# Patient Record
Sex: FEMALE | Race: WHITE | NOT HISPANIC OR LATINO | Employment: PART TIME | URBAN - METROPOLITAN AREA
[De-identification: names, ages, dates, MRNs, and addresses within clinical notes are randomized per-mention and may not be internally consistent; named-entity substitution may affect disease eponyms.]

---

## 2017-08-02 ENCOUNTER — OFFICE VISIT (OUTPATIENT)
Dept: URGENT CARE | Age: 67
End: 2017-08-02
Payer: COMMERCIAL

## 2017-08-02 PROCEDURE — 99213 OFFICE O/P EST LOW 20 MIN: CPT

## 2020-08-16 ENCOUNTER — APPOINTMENT (INPATIENT)
Dept: ULTRASOUND IMAGING | Facility: HOSPITAL | Age: 70
DRG: 305 | End: 2020-08-16
Payer: MEDICARE

## 2020-08-16 ENCOUNTER — APPOINTMENT (EMERGENCY)
Dept: CT IMAGING | Facility: HOSPITAL | Age: 70
DRG: 305 | End: 2020-08-16
Payer: MEDICARE

## 2020-08-16 ENCOUNTER — HOSPITAL ENCOUNTER (INPATIENT)
Facility: HOSPITAL | Age: 70
LOS: 1 days | Discharge: HOME | DRG: 305 | End: 2020-08-17
Attending: EMERGENCY MEDICINE | Admitting: HOSPITALIST
Payer: MEDICARE

## 2020-08-16 ENCOUNTER — APPOINTMENT (EMERGENCY)
Dept: RADIOLOGY | Facility: HOSPITAL | Age: 70
DRG: 305 | End: 2020-08-16
Payer: MEDICARE

## 2020-08-16 DIAGNOSIS — R07.9 CHEST PAIN: Primary | ICD-10-CM

## 2020-08-16 DIAGNOSIS — K80.20 CHOLELITHIASIS: ICD-10-CM

## 2020-08-16 DIAGNOSIS — I16.0 HYPERTENSIVE URGENCY: ICD-10-CM

## 2020-08-16 PROBLEM — E66.01 MORBID OBESITY WITH BMI OF 45.0-49.9, ADULT (HCC): Status: ACTIVE | Noted: 2020-08-16

## 2020-08-16 PROBLEM — E03.9 HYPOTHYROIDISM: Status: ACTIVE | Noted: 2020-08-16

## 2020-08-16 LAB
ALBUMIN SERPL BCP-MCNC: 3.6 G/DL (ref 3.5–5)
ALP SERPL-CCNC: 92 U/L (ref 46–116)
ALT SERPL W P-5'-P-CCNC: 46 U/L (ref 12–78)
ANION GAP SERPL CALCULATED.3IONS-SCNC: 8 MMOL/L (ref 4–13)
APTT PPP: 29 SECONDS (ref 23–37)
AST SERPL W P-5'-P-CCNC: 38 U/L (ref 5–45)
BASOPHILS # BLD AUTO: 0.07 THOUSANDS/ΜL (ref 0–0.1)
BASOPHILS NFR BLD AUTO: 1 % (ref 0–1)
BILIRUB SERPL-MCNC: 0.82 MG/DL (ref 0.2–1)
BUN SERPL-MCNC: 12 MG/DL (ref 5–25)
CALCIUM SERPL-MCNC: 9.1 MG/DL (ref 8.3–10.1)
CHLORIDE SERPL-SCNC: 102 MMOL/L (ref 100–108)
CO2 SERPL-SCNC: 31 MMOL/L (ref 21–32)
CREAT SERPL-MCNC: 0.92 MG/DL (ref 0.6–1.3)
EOSINOPHIL # BLD AUTO: 0.44 THOUSAND/ΜL (ref 0–0.61)
EOSINOPHIL NFR BLD AUTO: 5 % (ref 0–6)
ERYTHROCYTE [DISTWIDTH] IN BLOOD BY AUTOMATED COUNT: 15.6 % (ref 11.6–15.1)
GFR SERPL CREATININE-BSD FRML MDRD: 65 ML/MIN/1.73SQ M
GLUCOSE SERPL-MCNC: 109 MG/DL (ref 65–140)
HCT VFR BLD AUTO: 42.2 % (ref 34.8–46.1)
HGB BLD-MCNC: 13.2 G/DL (ref 11.5–15.4)
IMM GRANULOCYTES # BLD AUTO: 0.05 THOUSAND/UL (ref 0–0.2)
IMM GRANULOCYTES NFR BLD AUTO: 1 % (ref 0–2)
INR PPP: 0.96 (ref 0.84–1.19)
LIPASE SERPL-CCNC: 104 U/L (ref 73–393)
LYMPHOCYTES # BLD AUTO: 1.89 THOUSANDS/ΜL (ref 0.6–4.47)
LYMPHOCYTES NFR BLD AUTO: 22 % (ref 14–44)
MCH RBC QN AUTO: 26.1 PG (ref 26.8–34.3)
MCHC RBC AUTO-ENTMCNC: 31.3 G/DL (ref 31.4–37.4)
MCV RBC AUTO: 84 FL (ref 82–98)
MONOCYTES # BLD AUTO: 0.45 THOUSAND/ΜL (ref 0.17–1.22)
MONOCYTES NFR BLD AUTO: 5 % (ref 4–12)
NEUTROPHILS # BLD AUTO: 5.72 THOUSANDS/ΜL (ref 1.85–7.62)
NEUTS SEG NFR BLD AUTO: 66 % (ref 43–75)
NRBC BLD AUTO-RTO: 0 /100 WBCS
PLATELET # BLD AUTO: 242 THOUSANDS/UL (ref 149–390)
PMV BLD AUTO: 9.9 FL (ref 8.9–12.7)
POTASSIUM SERPL-SCNC: 3.9 MMOL/L (ref 3.5–5.3)
PROT SERPL-MCNC: 7.6 G/DL (ref 6.4–8.2)
PROTHROMBIN TIME: 12.9 SECONDS (ref 11.6–14.5)
RBC # BLD AUTO: 5.05 MILLION/UL (ref 3.81–5.12)
SODIUM SERPL-SCNC: 141 MMOL/L (ref 136–145)
TROPONIN I SERPL-MCNC: <0.02 NG/ML
TSH SERPL DL<=0.05 MIU/L-ACNC: 1.55 UIU/ML (ref 0.36–3.74)
WBC # BLD AUTO: 8.62 THOUSAND/UL (ref 4.31–10.16)

## 2020-08-16 PROCEDURE — 74174 CTA ABD&PLVS W/CONTRAST: CPT

## 2020-08-16 PROCEDURE — 85730 THROMBOPLASTIN TIME PARTIAL: CPT | Performed by: EMERGENCY MEDICINE

## 2020-08-16 PROCEDURE — 71045 X-RAY EXAM CHEST 1 VIEW: CPT

## 2020-08-16 PROCEDURE — 36415 COLL VENOUS BLD VENIPUNCTURE: CPT

## 2020-08-16 PROCEDURE — 85025 COMPLETE CBC W/AUTO DIFF WBC: CPT | Performed by: EMERGENCY MEDICINE

## 2020-08-16 PROCEDURE — 84484 ASSAY OF TROPONIN QUANT: CPT | Performed by: EMERGENCY MEDICINE

## 2020-08-16 PROCEDURE — 80053 COMPREHEN METABOLIC PANEL: CPT | Performed by: EMERGENCY MEDICINE

## 2020-08-16 PROCEDURE — 71275 CT ANGIOGRAPHY CHEST: CPT

## 2020-08-16 PROCEDURE — 99285 EMERGENCY DEPT VISIT HI MDM: CPT

## 2020-08-16 PROCEDURE — 99223 1ST HOSP IP/OBS HIGH 75: CPT | Performed by: HOSPITALIST

## 2020-08-16 PROCEDURE — 85610 PROTHROMBIN TIME: CPT | Performed by: EMERGENCY MEDICINE

## 2020-08-16 PROCEDURE — 84443 ASSAY THYROID STIM HORMONE: CPT | Performed by: HOSPITALIST

## 2020-08-16 PROCEDURE — 83690 ASSAY OF LIPASE: CPT | Performed by: EMERGENCY MEDICINE

## 2020-08-16 PROCEDURE — 84484 ASSAY OF TROPONIN QUANT: CPT | Performed by: HOSPITALIST

## 2020-08-16 PROCEDURE — 96374 THER/PROPH/DIAG INJ IV PUSH: CPT

## 2020-08-16 PROCEDURE — G1004 CDSM NDSC: HCPCS

## 2020-08-16 PROCEDURE — 99285 EMERGENCY DEPT VISIT HI MDM: CPT | Performed by: EMERGENCY MEDICINE

## 2020-08-16 PROCEDURE — 93005 ELECTROCARDIOGRAM TRACING: CPT

## 2020-08-16 RX ORDER — ONDANSETRON 2 MG/ML
4 INJECTION INTRAMUSCULAR; INTRAVENOUS EVERY 6 HOURS PRN
Status: DISCONTINUED | OUTPATIENT
Start: 2020-08-16 | End: 2020-08-17 | Stop reason: HOSPADM

## 2020-08-16 RX ORDER — HYDROCHLOROTHIAZIDE 12.5 MG/1
12.5 TABLET ORAL DAILY
Status: CANCELLED | OUTPATIENT
Start: 2020-08-16

## 2020-08-16 RX ORDER — OXYCODONE HYDROCHLORIDE 5 MG/1
5 TABLET ORAL EVERY 4 HOURS PRN
Status: DISCONTINUED | OUTPATIENT
Start: 2020-08-16 | End: 2020-08-17 | Stop reason: HOSPADM

## 2020-08-16 RX ORDER — LEVOTHYROXINE SODIUM 0.07 MG/1
75 TABLET ORAL
Status: DISCONTINUED | OUTPATIENT
Start: 2020-08-17 | End: 2020-08-17 | Stop reason: HOSPADM

## 2020-08-16 RX ORDER — LEVOTHYROXINE SODIUM 0.07 MG/1
TABLET ORAL DAILY
COMMUNITY

## 2020-08-16 RX ORDER — ACETAMINOPHEN 325 MG/1
650 TABLET ORAL EVERY 6 HOURS PRN
Status: DISCONTINUED | OUTPATIENT
Start: 2020-08-16 | End: 2020-08-17 | Stop reason: HOSPADM

## 2020-08-16 RX ORDER — NIFEDIPINE 10 MG/1
10 CAPSULE ORAL ONCE
Status: COMPLETED | OUTPATIENT
Start: 2020-08-16 | End: 2020-08-16

## 2020-08-16 RX ORDER — HYDRALAZINE HYDROCHLORIDE 20 MG/ML
10 INJECTION INTRAMUSCULAR; INTRAVENOUS EVERY 4 HOURS PRN
Status: DISCONTINUED | OUTPATIENT
Start: 2020-08-16 | End: 2020-08-17 | Stop reason: HOSPADM

## 2020-08-16 RX ORDER — CALCIUM CARBONATE 200(500)MG
1000 TABLET,CHEWABLE ORAL 3 TIMES DAILY PRN
Status: DISCONTINUED | OUTPATIENT
Start: 2020-08-16 | End: 2020-08-17 | Stop reason: HOSPADM

## 2020-08-16 RX ORDER — ONDANSETRON 2 MG/ML
4 INJECTION INTRAMUSCULAR; INTRAVENOUS EVERY 6 HOURS PRN
Status: CANCELLED | OUTPATIENT
Start: 2020-08-16

## 2020-08-16 RX ORDER — ACETAMINOPHEN 325 MG/1
650 TABLET ORAL EVERY 4 HOURS PRN
Status: CANCELLED | OUTPATIENT
Start: 2020-08-16

## 2020-08-16 RX ORDER — LEVOTHYROXINE SODIUM 0.07 MG/1
75 TABLET ORAL DAILY
Status: CANCELLED | OUTPATIENT
Start: 2020-08-17

## 2020-08-16 RX ORDER — OXYCODONE HYDROCHLORIDE 10 MG/1
10 TABLET ORAL EVERY 4 HOURS PRN
Status: DISCONTINUED | OUTPATIENT
Start: 2020-08-16 | End: 2020-08-17 | Stop reason: HOSPADM

## 2020-08-16 RX ORDER — HYDROCHLOROTHIAZIDE 12.5 MG/1
CAPSULE, GELATIN COATED ORAL
COMMUNITY
End: 2020-08-17 | Stop reason: HOSPADM

## 2020-08-16 RX ORDER — LABETALOL 20 MG/4 ML (5 MG/ML) INTRAVENOUS SYRINGE
10 ONCE
Status: COMPLETED | OUTPATIENT
Start: 2020-08-16 | End: 2020-08-16

## 2020-08-16 RX ADMIN — MORPHINE SULFATE 2 MG: 2 INJECTION, SOLUTION INTRAMUSCULAR; INTRAVENOUS at 17:30

## 2020-08-16 RX ADMIN — HYDRALAZINE HYDROCHLORIDE 10 MG: 20 INJECTION INTRAMUSCULAR; INTRAVENOUS at 17:28

## 2020-08-16 RX ADMIN — LABETALOL 20 MG/4 ML (5 MG/ML) INTRAVENOUS SYRINGE 10 MG: at 14:43

## 2020-08-16 RX ADMIN — NIFEDIPINE 10 MG: 10 CAPSULE ORAL at 17:53

## 2020-08-16 RX ADMIN — IOHEXOL 100 ML: 350 INJECTION, SOLUTION INTRAVENOUS at 14:13

## 2020-08-16 NOTE — ED PROVIDER NOTES
History  Chief Complaint   Patient presents with    Chest Pain     Constant midsternal cp radiating to back x 4 days  Took rolaids without relief  21-year-old female presents to the emergency department for evaluation of substernal chest pain that has been present for the past 4 days  Patient states she feels the pain radiating into her back  She has been taking several relate tablets without relief  Patient admits to having a history of hypertension and is noncompliant with medications  Patient denies associated shortness of breath  No fevers or chills  No cough  Pain seems essentially unchanged with food intake  No history of biliary colic or pancreatitis  Patient denies previous cardiac history  History provided by:  Patient and medical records   used: No    Chest Pain   Pain location:  Substernal area  Pain quality: pressure    Pain radiates to:  Upper back  Pain radiates to the back: yes    Pain severity:  Severe  Onset quality:  Gradual  Duration:  4 days  Timing:  Constant  Progression:  Unchanged  Chronicity:  New  Context: at rest    Relieved by:  Nothing  Worsened by:  Nothing tried  Ineffective treatments:  Antacids  Associated symptoms: no anorexia, no anxiety, no cough, no fever, no lower extremity edema, no nausea, no palpitations, no shortness of breath and not vomiting    Risk factors: obesity    Risk factors: no coronary artery disease and no smoking        Prior to Admission Medications   Prescriptions Last Dose Informant Patient Reported?  Taking?   hydrochlorothiazide (MICROZIDE) 12 5 mg capsule More than a month at Unknown time  Yes No   Sig: Take by mouth   levothyroxine 75 mcg tablet 8/16/2020 at Unknown time  Yes Yes   Sig: Take by mouth daily      Facility-Administered Medications: None       Past Medical History:   Diagnosis Date    Hypertension     Psoriasis        Past Surgical History:   Procedure Laterality Date    HYSTERECTOMY         History reviewed  No pertinent family history  I have reviewed and agree with the history as documented  E-Cigarette/Vaping    E-Cigarette Use Never User      E-Cigarette/Vaping Substances    Nicotine No     THC No     CBD No      Social History     Tobacco Use    Smoking status: Never Smoker    Smokeless tobacco: Never Used   Substance Use Topics    Alcohol use: Never     Frequency: Never    Drug use: Never       Review of Systems   Constitutional: Negative for appetite change, fever and unexpected weight change  Respiratory: Negative for cough, choking and shortness of breath  Cardiovascular: Positive for chest pain  Negative for palpitations  Gastrointestinal: Negative for anorexia, nausea and vomiting  All other systems reviewed and are negative  Physical Exam  Physical Exam  Vitals signs and nursing note reviewed  Constitutional:       Appearance: She is well-developed  She is obese  She is not ill-appearing  HENT:      Head: Normocephalic  Nose: Nose normal       Mouth/Throat:      Pharynx: No oropharyngeal exudate  Eyes:      Conjunctiva/sclera: Conjunctivae normal       Pupils: Pupils are equal, round, and reactive to light  Neck:      Musculoskeletal: Normal range of motion and neck supple  Cardiovascular:      Rate and Rhythm: Normal rate and regular rhythm  Heart sounds: Normal heart sounds  No murmur  Pulmonary:      Effort: Pulmonary effort is normal       Breath sounds: Normal breath sounds  Chest:      Chest wall: No tenderness  Abdominal:      General: Bowel sounds are normal  There is no distension  Palpations: Abdomen is soft  Tenderness: There is no abdominal tenderness  There is no guarding or rebound  Musculoskeletal: Normal range of motion  General: No tenderness or deformity  Lymphadenopathy:      Cervical: No cervical adenopathy  Skin:     General: Skin is warm and dry  Findings: No rash     Neurological:      Mental Status: She is alert and oriented to person, place, and time  Cranial Nerves: No cranial nerve deficit  Sensory: No sensory deficit  Motor: No abnormal muscle tone  Coordination: Coordination normal       Gait: Gait normal       Deep Tendon Reflexes: Reflexes are normal and symmetric  Psychiatric:         Behavior: Behavior normal          Thought Content:  Thought content normal          Judgment: Judgment normal          Vital Signs  ED Triage Vitals   Temperature Pulse Respirations Blood Pressure SpO2   08/16/20 1305 08/16/20 1305 08/16/20 1305 08/16/20 1305 08/16/20 1305   98 2 °F (36 8 °C) 85 18 (!) 202/82 95 %      Temp Source Heart Rate Source Patient Position - Orthostatic VS BP Location FiO2 (%)   08/16/20 1305 08/16/20 1305 08/16/20 1552 08/16/20 1305 --   Oral Monitor Lying Right arm       Pain Score       08/16/20 1305       5           Vitals:    08/16/20 1559 08/16/20 1710 08/16/20 1903 08/17/20 0700   BP: (!) 216/89 (!) 203/91 142/64 160/75   Pulse:  66 76 67   Patient Position - Orthostatic VS:  Lying  Lying         Visual Acuity      ED Medications  Medications   Labetalol HCl (NORMODYNE) injection 10 mg (10 mg Intravenous Given 8/16/20 1443)   iohexol (OMNIPAQUE) 350 MG/ML injection (MULTI-DOSE) 100 mL (100 mL Intravenous Given 8/16/20 1413)   morphine injection 2 mg (2 mg Intravenous Given 8/16/20 1730)   NIFEdipine (PROCARDIA) capsule 10 mg (10 mg Oral Given 8/16/20 1753)   potassium chloride (K-DUR,KLOR-CON) CR tablet 20 mEq (20 mEq Oral Given 8/17/20 1026)       Diagnostic Studies  Results Reviewed     Procedure Component Value Units Date/Time    TSH, 3rd generation [[id number]]  (Normal) Collected:  08/16/20 1332    Lab Status:  Final result Specimen:  Blood from Arm, Right Updated:  08/16/20 1741     TSH 3RD GENERATON 1 549 uIU/mL     Narrative:       Patients undergoing fluorescein dye angiography may retain small amounts of fluorescein in the body for 48-72 hours post procedure  Samples containing fluorescein can produce falsely depressed TSH values  If the patient had this procedure,a specimen should be resubmitted post fluorescein clearance        Lipase [[id number]]  (Normal) Collected:  08/16/20 1332    Lab Status:  Final result Specimen:  Blood from Arm, Right Updated:  08/16/20 1523     Lipase 104 u/L     Protime-INR [[id number]]  (Normal) Collected:  08/16/20 1403    Lab Status:  Final result Specimen:  Blood from Arm, Right Updated:  08/16/20 1434     Protime 12 9 seconds      INR 0 96    APTT [[id number]]  (Normal) Collected:  08/16/20 1403    Lab Status:  Final result Specimen:  Blood from Arm, Right Updated:  08/16/20 1434     PTT 29 seconds     Comprehensive metabolic panel [[id number]] Collected:  08/16/20 1332    Lab Status:  Final result Specimen:  Blood from Arm, Right Updated:  08/16/20 1358     Sodium 141 mmol/L      Potassium 3 9 mmol/L      Chloride 102 mmol/L      CO2 31 mmol/L      ANION GAP 8 mmol/L      BUN 12 mg/dL      Creatinine 0 92 mg/dL      Glucose 109 mg/dL      Calcium 9 1 mg/dL      AST 38 U/L      ALT 46 U/L      Alkaline Phosphatase 92 U/L      Total Protein 7 6 g/dL      Albumin 3 6 g/dL      Total Bilirubin 0 82 mg/dL      eGFR 65 ml/min/1 73sq m     Narrative:       [place] guidelines for Chronic Kidney Disease (CKD):     Stage 1 with normal or high GFR (GFR > 90 mL/min/1 73 square meters)    Stage 2 Mild CKD (GFR = 60-89 mL/min/1 73 square meters)    Stage 3A Moderate CKD (GFR = 45-59 mL/min/1 73 square meters)    Stage 3B Moderate CKD (GFR = 30-44 mL/min/1 73 square meters)    Stage 4 Severe CKD (GFR = 15-29 mL/min/1 73 square meters)    Stage 5 End Stage CKD (GFR <15 mL/min/1 73 square meters)  Note: GFR calculation is accurate only with a steady state creatinine    Troponin I [[id number]]  (Normal) Collected:  08/16/20 1332    Lab Status:  Final result Specimen:  Blood from Arm, Right Updated:  08/16/20 6358 Troponin I <0 02 ng/mL     CBC and differential [[id number]]  (Abnormal) Collected:  08/16/20 1332    Lab Status:  Final result Specimen:  Blood from Arm, Right Updated:  08/16/20 1347     WBC 8 62 Thousand/uL      RBC 5 05 Million/uL      Hemoglobin 13 2 g/dL      Hematocrit 42 2 %      MCV 84 fL      MCH 26 1 pg      MCHC 31 3 g/dL      RDW 15 6 %      MPV 9 9 fL      Platelets 297 Thousands/uL      nRBC 0 /100 WBCs      Neutrophils Relative 66 %      Immat GRANS % 1 %      Lymphocytes Relative 22 %      Monocytes Relative 5 %      Eosinophils Relative 5 %      Basophils Relative 1 %      Neutrophils Absolute 5 72 Thousands/µL      Immature Grans Absolute 0 05 Thousand/uL      Lymphocytes Absolute 1 89 Thousands/µL      Monocytes Absolute 0 45 Thousand/µL      Eosinophils Absolute 0 44 Thousand/µL      Basophils Absolute 0 07 Thousands/µL                  US gallbladder   Final Result by Cristino Chiu MD (08/17 0804)         1  Cholelithiasis without findings of acute cholecystitis  No biliary ductal dilatation  2   Hepatomegaly with mild diffuse steatosis  Workstation performed: [id number]         CTA dissection protocol chest abdomen pelvis w wo contrast   Final Result by Yahaira Sanderson MD (08/16 1455)         1  No evidence for acute pulmonary embolism or other acute abnormality in the chest, abdomen or pelvis  2   Gallstones without evidence for cholecystitis or biliary obstruction  3   Hepatomegaly with diffuse hepatic steatosis  4   Indeterminate renal lesions as described above for which further outpatient evaluation is warranted within 3 months  5   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  6   Thyroid nodule or nodules warranting further investigation with outpatient sonography  7   Additional findings as noted  The study was marked in [facility] for immediate notification                    Workstation performed: [id number]         XR chest 1 view portable   Final Result by Daisy Coe MD (08/16 1401)      No acute cardiopulmonary disease              Workstation performed: [id number]                    Procedures  ECG 12 Lead Documentation Only    Date/Time: 8/16/2020 3:12 PM  Performed by: Mason Munoz DO  Authorized by: Mason Munoz DO     Indications / Diagnosis:  Chest pain  ECG reviewed by me, the ED Provider: yes    Patient location:  ED  Previous ECG:     Previous ECG:  Unavailable  Interpretation:     Interpretation: normal    Rate:     ECG rate:  71    ECG rate assessment: normal    Rhythm:     Rhythm: sinus rhythm    Ectopy:     Ectopy: none    QRS:     QRS axis:  Normal  Conduction:     Conduction: normal    ST segments:     ST segments:  Normal  T waves:     T waves: normal               ED Course             HEART Risk Score      Most Recent Value   Heart Score Risk Calculator   History  1 Filed at: 08/16/2020 1528   ECG  0 Filed at: 08/16/2020 1528   Age  2 Filed at: 08/16/2020 1528   Risk Factors  2 Filed at: 08/16/2020 1528   Troponin  0 Filed at: 08/16/2020 1528   HEART Score  5 Filed at: 08/16/2020 1528                        MOY Risk Score      Most Recent Value   Age >= 72  1 Filed at: 08/16/2020 1633   Known CAD (stenosis >= 50%)  0 Filed at: 08/16/2020 1633   Recent (<=24 hrs) Service Angina  1 Filed at: 08/16/2020 1633   ST Deviation >= 0 5 mm  0 Filed at: 08/16/2020 1633   3+ CAD Risk Factors (FHx, HTN, HLP, DM, Smoker)  1 Filed at: 08/16/2020 1633   Aspirin Use Past 7 Days  0 Filed at: 08/16/2020 1633   Elevated Cardiac Markers  0 Filed at: 08/16/2020 1633   MOY Risk Score (Calculated)  3 Filed at: 08/16/2020 1633                    MDM  Number of Diagnoses or Management Options  Chest pain: new and requires workup  Cholelithiasis: new and requires workup     Amount and/or Complexity of Data Reviewed  Clinical lab tests: reviewed and ordered  Tests in the radiology section of CPT®: ordered and reviewed  Tests in the medicine section of CPT®: ordered and reviewed  Decide to obtain previous medical records or to obtain history from someone other than the patient: yes  Discuss the patient with other providers: yes  Independent visualization of images, tracings, or specimens: yes    Patient Progress  Patient progress: stable        Disposition  Final diagnoses:   Chest pain   Cholelithiasis     Time reflects when diagnosis was documented in both MDM as applicable and the Disposition within this note     Time User Action Codes Description Comment    8/16/2020  3:38 PM Jeancarlos Formosa Add [R07 9] Chest pain     8/16/2020  3:41 PM White, Maryjane Add [K80 20] Cholelithiasis     8/17/2020 10:45 AM Denise Rojelio Add [I16 0] Hypertensive urgency       ED Disposition     ED Disposition Condition Date/Time Comment    Admit Stable Sun Aug 16, 2020  3:38 PM Case was discussed with Dr Camila Prince and the patient's admission status was agreed to be Admission Status: observation status to the service of Dr Camila Prince           Follow-up Information     Follow up With Specialties Details Why Stacie Santiago MD Internal Medicine Schedule an appointment as soon as possible for a visit in 1 week(s)  [address] [place] [address], [place] [address]  [phone]            Discharge Medication List as of 8/17/2020 11:07 AM      START taking these medications    Details   acetaminophen (TYLENOL) 325 mg tablet Take 2 tablets (650 mg total) by mouth every 6 (six) hours as needed for mild pain, headaches or fever, Starting Mon 8/17/2020, Normal      hydrochlorothiazide (HYDRODIURIL) 25 mg tablet Take 1 tablet (25 mg total) by mouth daily, Starting Tue 8/18/2020, Until Thu 9/17/2020, Normal         CONTINUE these medications which have NOT CHANGED    Details   levothyroxine 75 mcg tablet Take by mouth daily, Historical Med         STOP taking these medications       hydrochlorothiazide (MICROZIDE) 12 5 mg capsule Comments:   Reason for Stopping:             Outpatient Discharge Orders   Call provider for:  severe uncontrolled pain     Call provider for:  persistent nausea or vomiting     Call provider for:  persistent dizziness or light-headedness     Call provider for:  extreme fatigue     Call provider for:  difficulty breathing, headache or visual disturbances       PDMP Review       Value Time User    PDMP Reviewed  Yes 8/16/2020  4:42 PM Morena Gray MD          ED Provider  Electronically Signed by           Wilberto Pryor DO  08/21/20 1366

## 2020-08-16 NOTE — ASSESSMENT & PLAN NOTE
Patient takes Synthroid for hypothyroidism, TSH is within normal limits    · Continue home medications:  Synthroid 75 mcg

## 2020-08-16 NOTE — H&P
H&P- Carline Link 1950, 81 y o  female MRN: [id number]    Unit/Bed#: S -01 Encounter: [id number]    Primary Care Provider: Matias Samaniego MD   Date and time admitted to hospital: 8/16/2020  1:02 PM        * Hypertensive urgency  Assessment & Plan  22-year-old female with history of hypertension with medication noncompliance, morbid obesity presents with chest pain and asymptomatic hypertensive urgency with blood pressure of 202/82  Patient is prescribed 12 5 mg of hydrochlorothiazide, patient does not take medications  Patient's previous blood pressures have been in the systolics of 688  Blood pressure to 202/82, was given 10 mg of labetalol blood pressure remained systolic greater than 231  Hypertensive urgency may be due to uncontrolled hypertension secondary to medication noncompliance or pain  · Hydralazine 10 mg q 4h p r n  for systolic greater than 145  · One time dose of nifedipine 10 mg, monitor BP response  · Repeat BMP in the morning        Chest pain  Assessment & Plan  Patient presents with midsternal chest pain that radiates to the back for 4 days with waxing and waning  Patient will be under observation for ACS rule out  Consider other etiologies such as cholelithiasis  CTA:  Aortic dissection ruled out, troponin negative x1, lipase WNL, EKG was normal and showed normal sinus rhythm  CBC and CMP are unremarkable    · Continue telemetry 48 hours  · Trend troponins x3 total (x1 negative)  · EKG with troponin or change in symptoms        Cholelithiasis  Assessment & Plan  Patient presents with midsternal chest pain however on abdominal exam, CT shows gallstones but negative for biliary obstruction and acute cholecystitis, patient has positive Villegas sign which is suspicious for pain due to biliary etiology such as biliary colic  CMP is unremarkable      · Follow-up RUQ ultrasound  · Pain management  · One time dose of morphine  · Tylenol p r n  Hypothyroidism  Assessment & Plan  Patient takes Synthroid for hypothyroidism    · Continue home medications:  Synthroid 75 mcg  · Follow-up TSH level    VTE Prophylaxis: Enoxaparin (Lovenox)  / reason for no mechanical VTE prophylaxis Lymphedema bilateral lower extremity tender   Code Status:  Level 1  POLST: POLST form is not discussed and not completed at this time  Anticipated Length of Stay:  Patient will be admitted on an Inpatient basis with an anticipated length of stay of  > 2 midnights  Justification for Hospital Stay:  Hypertensive urgency, chest pain    Chief Complaint:   I have pain in my chest    History of Present Illness:    Carline Link is a 81 y o  female history of hypertension, medication noncompliance, bilateral lymphedema, who presents with chest pain and asymptomatic hypertensive urgency (systolic greater than 912)  Patient states she had acute onset, new occurrence midsternal chest pain that radiates to the back the past 4 days  Pain is waxing and waning, sharp and progressive, and continues at rest   Denies pain radiating to the arm  Denies any inciting or relieving factors (rolaids and [place] did not relieve the pain)  Patient does state that back pain worsens when supine  In the ED, patient's blood pressure was 202/82, after 10 mg of labetalol, patient's blood pressure continues to be elevated at 216/89  The patient reports prescribed 12 5 mg of hydrochlorothiazide however patient admits to medication noncompliance  Patient denies symptoms of hypertensive encephalopathy  Denies:  Diaphoresis, nausea, vomiting, fevers or chills, changes in vision, headache, pleuritic pain, trauma to the chest, numbness and tingling, GERD symptoms      Review of Systems:    Review of Systems   Constitutional: Negative  HENT: Negative  Eyes: Negative  Respiratory: Negative      Cardiovascular: Positive for chest pain and leg swelling (Patient reports lymphedema secondary to prior hysterectomy)  Negative for palpitations  Gastrointestinal: Positive for abdominal distention (Lymphedema of the abdomen)  Negative for blood in stool, constipation and nausea  Endocrine: Positive for cold intolerance  Negative for heat intolerance, polydipsia, polyphagia and polyuria  Genitourinary: Negative  Musculoskeletal: Negative  Skin: Negative  Neurological: Negative  Hematological: Negative  Psychiatric/Behavioral: Negative for agitation  Past Medical and Surgical History:     Past Medical History:   Diagnosis Date    Hypertension     Psoriasis        Past Surgical History:   Procedure Laterality Date    HYSTERECTOMY         Meds/Allergies:    Prior to Admission medications    Medication Sig Start Date End Date Taking? Authorizing Provider   levothyroxine 75 mcg tablet Take by mouth daily   Yes Historical Provider, MD   hydrochlorothiazide (MICROZIDE) 12 5 mg capsule Take by mouth    Historical Provider, MD     I have reviewed home medications with patient personally  Allergies:    Allergies   Allergen Reactions    Metformin     Sulfa Antibiotics        Social History:     Marital Status: /Civil Union   Occupation:  Works for [employer]  Patient Pre-hospital Living Situation:  Lives at home  Patient Pre-hospital Level of Mobility:  Does not use device  Patient Pre-hospital Diet Restrictions:  None  Substance Use History:   Social History     Substance and Sexual Activity   Alcohol Use Never    Frequency: Never     Social History     Tobacco Use   Smoking Status Never Smoker   Smokeless Tobacco Never Used     Social History     Substance and Sexual Activity   Drug Use Never       Family History:    Father:  Diabetes, heart disease  Mother:  Ovarian cancer  Sister:  Ovarian cancer  Maternal aunt:  Colon cancer    Physical Exam:     Vitals:   Blood Pressure: (!) 203/91 (08/16/20 1710)  Pulse: 66 (08/16/20 1710)  Temperature: 98 7 °F (37 1 °C) (08/16/20 1710)  Temp Source: Oral (08/16/20 1710)  Respirations: 18 (08/16/20 1710)  Height: 5' 3" (160 cm) (08/16/20 1710)  Weight - Scale: 118 kg (260 lb) (08/16/20 1710)  SpO2: 97 % (08/16/20 1710)    Physical Exam  Vitals signs reviewed  Constitutional:       Appearance: She is obese  HENT:      Head: Normocephalic  Comments: Patchy alopecia of the scalp     Nose: No congestion or rhinorrhea  Mouth/Throat:      Mouth: Mucous membranes are moist    Eyes:      Extraocular Movements: Extraocular movements intact  Pupils: Pupils are equal, round, and reactive to light  Neck:      Musculoskeletal: Neck supple  Cardiovascular:      Rate and Rhythm: Normal rate and regular rhythm  Comments: Distant heart sounds likely due to body habitus  Pulmonary:      Effort: Pulmonary effort is normal       Comments: Decreased breath sounds due to body habitus likely  Abdominal:      General: Bowel sounds are normal       Tenderness: There is abdominal tenderness  There is guarding (Voluntary guarding)  There is no rebound  Comments: Edematous, soft  Villegas sign positive   Musculoskeletal:         General: Swelling and tenderness present  Right lower leg: Edema present  Left lower leg: Edema present  Skin:     General: Skin is warm and dry  Neurological:      General: No focal deficit present  Mental Status: She is alert and oriented to person, place, and time  Additional Data:     Lab Results: I have personally reviewed pertinent reports        Results from last 7 days   Lab Units 08/16/20  1332   WBC Thousand/uL 8 62   HEMOGLOBIN g/dL 13 2   HEMATOCRIT % 42 2   PLATELETS Thousands/uL 242   NEUTROS PCT % 66   LYMPHS PCT % 22   MONOS PCT % 5   EOS PCT % 5     Results from last 7 days   Lab Units 08/16/20  1332   POTASSIUM mmol/L 3 9   CHLORIDE mmol/L 102   CO2 mmol/L 31   BUN mg/dL 12   CREATININE mg/dL 0 92   CALCIUM mg/dL 9 1   ALK PHOS U/L 92   ALT U/L 46   AST U/L 38 Results from last 7 days   Lab Units 08/16/20  1403   INR  0 96       Imaging: I have personally reviewed pertinent reports  Xr Chest 1 View Portable    Result Date: 8/16/2020  Narrative: CHEST INDICATION:   pain  Constant midsternal chest pain radiating to back for 4 days  COMPARISON:  None EXAM PERFORMED/VIEWS:  XR CHEST PORTABLE FINDINGS: Cardiomediastinal silhouette appears unremarkable  The lungs are clear  No pneumothorax or pleural effusion  Osseous structures appear within normal limits for patient age  Impression: No acute cardiopulmonary disease  Workstation performed: [id number]     Cta Dissection Protocol Chest Abdomen Pelvis W Wo Contrast    Result Date: 8/16/2020  Narrative: CT ANGIOGRAM OF THE CHEST, ABDOMEN AND PELVIS WITH AND WITHOUT IV CONTRAST INDICATION:  Chest pain radiating to back COMPARISON: Chest radiograph 8/16/2020 TECHNIQUE:  CT angiogram examination of the chest, abdomen and pelvis was performed according to standard protocol  This examination, like all CT scans performed in the [facility], was performed utilizing techniques to minimize radiation dose exposure, including the use of iterative reconstruction and automated exposure control  Contrast as well as noncontrast images were obtained  3D reconstructions were performed an independent workstation, and are supplied for review  Rad dose 2522 mGy-cm IV Contrast:  100 mL of iohexol (OMNIPAQUE) Enteric Contrast:  Not given FINDINGS: VASCULAR STRUCTURES:  There is adequate opacification of the pulmonary arterial vasculature with no filling defects identified to suggest acute pulmonary emboli  Pulmonary artery is of normal caliber  There is a normal RV/LV ratio noted  The thoracic aorta is normal in course and caliber with widely patent great vessel origins from the aortic arch  Abdominal aorta is normal in course and caliber and demonstrate scattered atherosclerotic calcification along its length  The celiac, superior mesenteric, inferior mesenteric and renal artery origins are widely patent  Aortic bifurcation appears normal as do the common, internal, and external iliac and visualized proximal common, superficial and deep femoral arteries  Inferior vena cava are normal in course and caliber  Portal venous system unremarkable  OTHER FINDINGS: CHEST: HEART:  The heart is mildly enlarged in a global fashion    LUNGS:  Minimal dependent subsegmental atelectasis in the lung bases is present with otherwise clear lungs  Central airways are patent  [place] PLEURA: No pleural effusion  MEDIASTINUM AND LEXIE:  Nonpathologically enlarged mediastinal lymph nodes are present  No hilar adenopathy appreciated    CHEST WALL AND LOWER NECK:  There is a large heterogeneous thyroid nodule or nodules identified involving the isthmus and right lobe collectively measuring 3 5 x 5 5 x 6 6 cm  Incidental discovery of one or more thyroid nodule(s) measuring more than 1 5 cm and without suspicious features is noted in this patient who is above 28years old; according to guidelines published in the February 2015 white paper on incidental thyroid nodules in the Journal of the [employer] of [facility]), further characterization with thyroid ultrasound is recommended    ABDOMEN LIVER/BILIARY TREE:  Diffuse hepatic steatosis is present without focal abnormality identified  The liver is mildly enlarged  GALLBLADDER:  Dominant calcified gallstone noted in the gallbladder neck without gallbladder wall thickening or pericholecystic fluid  No biliary ductal dilatation is appreciated  SPLEEN:  Unremarkable  Normal size  PANCREAS:  Unremarkable  ADRENAL GLANDS:  Unremarkable  KIDNEYS/URETERS:  There is a 1 9 cm lesion arising from the posterior upper pole left kidney with attenuation coefficient of 22 Hounsfield unit on the unenhanced and 34 Hounsfield unit on the enhanced images    While this likely represents a mildly complex complex (hemorrhagic or proteinaceous) cyst, an enhancing solid lesion is not entirely excludable  Recommend further outpatient evaluation with renal sonography and/or dedicated CT/MRI of the kidneys  There is a complex appearing low attenuation 8 mm lesion arising from the posterior lower pole the right kidney as well, too small for further evaluation  Fullness to the left renal collecting system is noted to the level the ureterovesical junction without evidence for obstructing calculus which may reflect congenital mild UPJ obstruction  The ureter does not appear to be dilated  No delayed nephrographic enhancement is noted  PELVIS REPRODUCTIVE ORGANS:  Hysterectomy URINARY BLADDER:  Unremarkable  ADDITIONAL ABDOMINAL AND PELVIC STRUCTURES: STOMACH AND BOWEL:  Unremarkable  ABDOMINOPELVIC CAVITY:  No pathologically enlarged mesenteric or retroperitoneal lymph nodes  No ascites or free intraperitoneal air  ABDOMINAL WALL/INGUINAL REGIONS:  There is a small fat-containing umbilical hernia  OSSEOUS STRUCTURES:  No acute fracture or destructive osseous lesion  Impression: 1  No evidence for acute pulmonary embolism or other acute abnormality in the chest, abdomen or pelvis  2   Gallstones without evidence for cholecystitis or biliary obstruction  3   Hepatomegaly with diffuse hepatic steatosis  4   Indeterminate renal lesions as described above for which further outpatient evaluation is warranted within 3 months  5   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

## 2020-08-16 NOTE — ASSESSMENT & PLAN NOTE
Patient presents with midsternal atypical chest pain that radiates to the back for 4 days with waxing and waning  Patient will be under observation for ACS rule out  Consider other etiologies such as cholelithiasis      In the ED, CTA:  Aortic dissection ruled out, troponin negative x1, lipase WNL, EKG was normal and showed normal sinus rhythm  CBC and CMP were unremarkable    · Continue telemetry 48 hours  · Trend troponins x3 total (x1 negative)  · EKG with troponin or change in symptoms

## 2020-08-16 NOTE — ASSESSMENT & PLAN NOTE
Patient presents with midsternal chest pain however on abdominal exam, CT shows gallstones but negative for biliary obstruction and acute cholecystitis, patient has positive Villegas sign which is suspicious for pain due to biliary etiology such as biliary colic  CMP is unremarkable  · Follow-up RUQ ultrasound  · Pain management  · One time dose of morphine  · Tylenol p r n

## 2020-08-17 ENCOUNTER — APPOINTMENT (INPATIENT)
Dept: ULTRASOUND IMAGING | Facility: HOSPITAL | Age: 70
DRG: 305 | End: 2020-08-17
Payer: MEDICARE

## 2020-08-17 VITALS
DIASTOLIC BLOOD PRESSURE: 75 MMHG | SYSTOLIC BLOOD PRESSURE: 160 MMHG | RESPIRATION RATE: 18 BRPM | WEIGHT: 260 LBS | BODY MASS INDEX: 46.07 KG/M2 | OXYGEN SATURATION: 95 % | HEIGHT: 63 IN | TEMPERATURE: 97.4 F | HEART RATE: 67 BPM

## 2020-08-17 LAB
ANION GAP SERPL CALCULATED.3IONS-SCNC: 10 MMOL/L (ref 4–13)
ATRIAL RATE: 74 BPM
BUN SERPL-MCNC: 12 MG/DL (ref 5–25)
CALCIUM SERPL-MCNC: 9.4 MG/DL (ref 8.3–10.1)
CHLORIDE SERPL-SCNC: 100 MMOL/L (ref 100–108)
CHOLEST SERPL-MCNC: 201 MG/DL (ref 50–200)
CO2 SERPL-SCNC: 28 MMOL/L (ref 21–32)
CREAT SERPL-MCNC: 0.78 MG/DL (ref 0.6–1.3)
GFR SERPL CREATININE-BSD FRML MDRD: 79 ML/MIN/1.73SQ M
GLUCOSE SERPL-MCNC: 107 MG/DL (ref 65–140)
HDLC SERPL-MCNC: 55 MG/DL
LDLC SERPL CALC-MCNC: 127 MG/DL (ref 0–100)
NONHDLC SERPL-MCNC: 146 MG/DL
P AXIS: 44 DEGREES
POTASSIUM SERPL-SCNC: 3.4 MMOL/L (ref 3.5–5.3)
PR INTERVAL: 156 MS
QRS AXIS: 8 DEGREES
QRSD INTERVAL: 88 MS
QT INTERVAL: 404 MS
QTC INTERVAL: 439 MS
SODIUM SERPL-SCNC: 138 MMOL/L (ref 136–145)
T WAVE AXIS: 31 DEGREES
TRIGL SERPL-MCNC: 94 MG/DL
TROPONIN I SERPL-MCNC: <0.02 NG/ML
TROPONIN I SERPL-MCNC: <0.02 NG/ML
VENTRICULAR RATE: 71 BPM

## 2020-08-17 PROCEDURE — 99238 HOSP IP/OBS DSCHRG MGMT 30/<: CPT | Performed by: HOSPITALIST

## 2020-08-17 PROCEDURE — 80061 LIPID PANEL: CPT | Performed by: PSYCHIATRY & NEUROLOGY

## 2020-08-17 PROCEDURE — 76705 ECHO EXAM OF ABDOMEN: CPT

## 2020-08-17 PROCEDURE — 84484 ASSAY OF TROPONIN QUANT: CPT | Performed by: HOSPITALIST

## 2020-08-17 PROCEDURE — 93010 ELECTROCARDIOGRAM REPORT: CPT | Performed by: INTERNAL MEDICINE

## 2020-08-17 PROCEDURE — 80048 BASIC METABOLIC PNL TOTAL CA: CPT | Performed by: PSYCHIATRY & NEUROLOGY

## 2020-08-17 RX ORDER — POTASSIUM CHLORIDE 20 MEQ/1
20 TABLET, EXTENDED RELEASE ORAL ONCE
Status: COMPLETED | OUTPATIENT
Start: 2020-08-17 | End: 2020-08-17

## 2020-08-17 RX ORDER — HYDROCHLOROTHIAZIDE 25 MG/1
25 TABLET ORAL DAILY
Status: DISCONTINUED | OUTPATIENT
Start: 2020-08-17 | End: 2020-08-17 | Stop reason: HOSPADM

## 2020-08-17 RX ORDER — HYDROCHLOROTHIAZIDE 25 MG/1
25 TABLET ORAL DAILY
Qty: 30 TABLET | Refills: 0 | Status: SHIPPED | OUTPATIENT
Start: 2020-08-18 | End: 2020-09-17

## 2020-08-17 RX ORDER — ACETAMINOPHEN 325 MG/1
650 TABLET ORAL EVERY 6 HOURS PRN
Qty: 30 TABLET | Refills: 0 | Status: SHIPPED | OUTPATIENT
Start: 2020-08-17

## 2020-08-17 RX ADMIN — LEVOTHYROXINE SODIUM 75 MCG: 75 TABLET ORAL at 04:58

## 2020-08-17 RX ADMIN — ACETAMINOPHEN 650 MG: 325 TABLET, FILM COATED ORAL at 04:58

## 2020-08-17 RX ADMIN — POTASSIUM CHLORIDE 20 MEQ: 1500 TABLET, EXTENDED RELEASE ORAL at 10:26

## 2020-08-17 RX ADMIN — ACETAMINOPHEN 650 MG: 325 TABLET, FILM COATED ORAL at 11:21

## 2020-08-17 RX ADMIN — HYDROCHLOROTHIAZIDE 25 MG: 25 TABLET ORAL at 10:26

## 2020-08-17 NOTE — ASSESSMENT & PLAN NOTE
59-year-old female with history of hypertension with medication noncompliance, morbid obesity presents with chest pain and asymptomatic hypertensive urgency with blood pressure of 202/82  Patient is prescribed 12 5 mg of hydrochlorothiazide, patient does not take medications  Patient's previous blood pressures have been in the systolics of 980  In the ED, patient was given 10 mg of labetalol, blood pressure remained greater than 535 systolic  Hypertensive urgency likely due to pain as blood pressure improved with pain management       · Discharge today with 25 mg hydrochlorothiazide  · Patient follow up with PCP within 1 week

## 2020-08-17 NOTE — ASSESSMENT & PLAN NOTE
Patient presents with midsternal chest pain however on abdominal exam, CT shows gallstones but negative for biliary obstruction and acute cholecystitis, patient has positive Villegas sign which is suspicious for pain due to biliary etiology such as biliary colic  CMP is unremarkable  Patient's symptoms have improved today  Right upper quadrant ultrasound shows no acute cholecystitis or biliary obstruction  Right upper quadrant ultrasound does show liver steatosis      · Patient be discharged home today  · Patient refuses to take lipid-lowering medications  · Encouraged lifestyle modifications  · Patient follow up PCP within 1 week

## 2020-08-17 NOTE — DISCHARGE INSTR - AVS FIRST PAGE
Dear Brandi Andino,     It was our pleasure to care for you here at [facility]  It is our hope that we were always able to exceed the expected standards for your care during your stay  You were hospitalized due to uncontrolled blood pressure  You were cared for on the third floor by Min Kyle MD under the service of Sumaya Shah MD with the [facility] who covers for your primary care physician (PCP), Serena Moseley MD, while you were hospitalized  If you have any questions or concerns related to this hospitalization, you may contact us at [phone]  For follow up as well as any medication refills, we recommend that you follow up with your primary care physician  A registered nurse will reach out to you by phone within a few days after your discharge to answer any additional questions that you may have after going home  However, at this time we provide for you here, the most important instructions / recommendations at discharge:     · Notable Medication Adjustments -   · Increase your HCTZ to 25mg daily  · Testing Required after Discharge -   · none  · Important follow up information -   · Please follow up with your PCP in 1 week   · Other Instructions -   · Please return to the ED for worsening headaches, chest pain, shortness of breath, abdominal pain, or uncontrolled blood pressure  · As discussed, we encourage lifestyle modifications with diet and exercise, please discuss with PCP  · Please discuss the incidental findings from CT scan with your PCP  · Please review this entire after visit summary as additional general instructions including medication list, appointments, activity, diet, any pertinent wound care, and other additional recommendations from your care team that may be provided for you        Sincerely,     Min Kyle MD

## 2020-08-17 NOTE — DISCHARGE SUMMARY
Discharge- Teo Hope 1950, 81 y o  female MRN: [id number]    Unit/Bed#: S -01 Encounter: [id number]    Primary Care Provider: Tommy Black MD   Date and time admitted to hospital: 8/16/2020  1:02 PM        * Hypertensive urgency  Assessment & Plan  61-year-old female with history of hypertension with medication noncompliance, morbid obesity presents with chest pain and asymptomatic hypertensive urgency with blood pressure of 202/82  Patient is prescribed 12 5 mg of hydrochlorothiazide, patient does not take medications  Patient's previous blood pressures have been in the systolics of 345  In the ED, patient was given 10 mg of labetalol, blood pressure remained greater than 293 systolic  Hypertensive urgency likely due to pain as blood pressure improved with pain management  · Discharge today with 25 mg hydrochlorothiazide  · Patient follow up with PCP within 1 week          Chest pain  Assessment & Plan  Patient presents with midsternal atypical chest pain that radiates to the back for 4 days with waxing and waning  Patient will be under observation for ACS rule out  Consider other etiologies such as cholelithiasis  In the ED, CTA:  Aortic dissection ruled out, troponin negative x1, lipase WNL, EKG was normal and showed normal sinus rhythm, CBC and CMP were unremarkable    Patient's symptoms have improved, ACS rule-out  Troponins negative x3, no events on telemetry  Lipid panel significant for elevated cholesterol and LDL  · Patient discharged today  · Patient follow up with PCP within 1 week  · Encouraged lifestyle modifications        Cholelithiasis  Assessment & Plan  Patient presents with midsternal chest pain however on abdominal exam, CT shows gallstones but negative for biliary obstruction and acute cholecystitis, patient has positive Ana Holstein sign which is suspicious for pain due to biliary etiology such as biliary colic  CMP is unremarkable      Patient's symptoms have improved today  Right upper quadrant ultrasound shows no acute cholecystitis or biliary obstruction  Right upper quadrant ultrasound does show liver steatosis  · Patient be discharged home today  · Patient refuses to take lipid-lowering medications  · Encouraged lifestyle modifications  · Patient follow up PCP within 1 week    Morbid obesity with BMI of 45 0-49 9, adult [facility])  Assessment & Plan  Encouraged lifestyle modifications    Hypothyroidism  Assessment & Plan  Patient takes Synthroid for hypothyroidism, TSH is within normal limits    · Continue home medications upon discharge:  Synthroid 75 mcg        Discharging Resident Physician: Sakshi Navarro MD  Attending: Meagan Arizmendi MD  PCP: Luc Maciel MD  Admission Date: 8/16/2020  Discharge Date: 08/17/20    Disposition:     Home    Reason for Admission:  Hypertensive urgency and chest pain    Consultations During Hospital Stay:  · None    Procedures Performed:     · None    Significant Findings / Test Results:     · Cholelithiasis, no acute cholecystitis or biliary obstruction  · Liver steatosis    Incidental Findings:   · Lesions in upper pole left kidney and lower pole of right kidney  · Thyroid nodules    Test Results Pending at Discharge (will require follow up): · None     Outpatient Tests Requested:  · None    Complications:  None    Hospital Course:     Harley Saleh is a 81 y o  female patient who originally presented to the hospital on 8/16/2020 due to chest pain and was found to be in hypertensive urgency  In the ED, Patient's blood pressure was not responsive to labetalol 10 mg  After patient's pain was controlled, and nifedipine 10 mg was administered, patient's blood pressure decreased down to the 140s/80s  Troponins negative x3, EKG normal sinus rhythm, lipase negative, ultrasound showed cholelithiasis with no acute cholecystitis or biliary obstruction    CT showed incidental findings of lesions both right and left kidney, thyroid nodules, cholelithiasis without acute cholecystitis or biliary obstruction  Overnight, Patient symptomatically improved with chest pain resolution, continued but improved back pain, and improved but elevated blood pressure  Patient discharged home with change in home medication to 25 mg hydrochlorothiazide (from 12 5 mg) and continue levothyroxine  Condition at Discharge: fair     Discharge Day Visit / Exam:     Subjective:  Patient states that she is feeling better today chest pain is is resolved with Tylenol p r n  however does continue to have some back pain  Patient denies any symptoms of hypertensive encephalopathy  Denies fevers or chills, shortness of breath, nausea or vomiting  Vitals: Blood Pressure: 160/75 (08/17/20 0700)  Pulse: 67 (08/17/20 0700)  Temperature: (!) 97 4 °F (36 3 °C) (08/17/20 0700)  Temp Source: Oral (08/17/20 0700)  Respirations: 18 (08/17/20 0700)  Height: 5' 3" (160 cm) (08/16/20 1710)  Weight - Scale: 118 kg (260 lb) (08/16/20 1710)  SpO2: 95 % (08/17/20 0700)  Exam:   Physical Exam  Vitals signs and nursing note reviewed  Constitutional:       Appearance: Normal appearance  She is obese  She is not ill-appearing  HENT:      Head: Normocephalic and atraumatic  Comments: Patchy hair loss on scalp     Nose: Nose normal       Mouth/Throat:      Mouth: Mucous membranes are moist    Eyes:      Extraocular Movements: Extraocular movements intact  Pupils: Pupils are equal, round, and reactive to light  Neck:      Musculoskeletal: Normal range of motion and neck supple  Cardiovascular:      Rate and Rhythm: Normal rate and regular rhythm  Pulmonary:      Effort: Pulmonary effort is normal       Breath sounds: Normal breath sounds  Abdominal:      General: Bowel sounds are normal       Palpations: Abdomen is soft  Tenderness: There is abdominal tenderness        Comments: Edematous, RUQ tenderness   Musculoskeletal:         General: Swelling and tenderness (Lymphedema) present  Right lower leg: Edema present  Left lower leg: Edema present  Skin:     General: Skin is warm and dry  Capillary Refill: Capillary refill takes less than 2 seconds  Neurological:      General: No focal deficit present  Mental Status: She is alert and oriented to person, place, and time  Discussion with Family:  Spoke with Carolyn Bates,  over the phone and updated on patient's status and discharge today    Discharge instructions/Information to patient and family:   See after visit summary for information provided to patient and family  Provisions for Follow-Up Care:  See after visit summary for information related to follow-up care and any pertinent home health orders  Planned Readmission:  None     Discharge Medications:  See after visit summary for reconciled discharge medications provided to patient and family        ** Please Note: This note has been constructed using a voice recognition system **

## 2020-08-17 NOTE — INCIDENTAL FINDINGS
The following findings require follow up:  Non-Radiographic finding   Finding:   · Thyroid nodule or nodules  · 1 9 cm lesion on the posterior upper pole the left kidney likely complex cyst  · 8 mm lesion posterior lower pole of the right kidney   Follow up required:  PCP   Follow up should be done within 1 week(s)    Please notify the following clinician to assist with the follow up:   Dr Joan Lau

## 2020-08-17 NOTE — ASSESSMENT & PLAN NOTE
Patient presents with midsternal atypical chest pain that radiates to the back for 4 days with waxing and waning  Patient will be under observation for ACS rule out  Consider other etiologies such as cholelithiasis  In the ED, CTA:  Aortic dissection ruled out, troponin negative x1, lipase WNL, EKG was normal and showed normal sinus rhythm, CBC and CMP were unremarkable    Patient's symptoms have improved, ACS rule-out  Troponins negative x3, no events on telemetry  Lipid panel significant for elevated cholesterol and LDL       · Patient discharged today  · Patient follow up with PCP within 1 week  · Encouraged lifestyle modifications

## 2020-08-17 NOTE — ASSESSMENT & PLAN NOTE
Patient takes Synthroid for hypothyroidism, TSH is within normal limits    · Continue home medications upon discharge:  Synthroid 75 mcg

## 2020-08-17 NOTE — PLAN OF CARE
Problem: Potential for Falls  Goal: Patient will remain free of falls  Description: INTERVENTIONS:  - Assess patient frequently for physical needs  -  Identify cognitive and physical deficits and behaviors that affect risk of falls  -  [place]a fall precautions as indicated by assessment   - Educate patient/family on patient safety including physical limitations  - Instruct patient to call for assistance with activity based on assessment  - Modify environment to reduce risk of injury  - Consider OT/PT consult to assist with strengthening/mobility  Outcome: Progressing     Problem: PAIN - ADULT  Goal: Verbalizes/displays adequate comfort level or baseline comfort level  Description: Interventions:  - Encourage patient to monitor pain and request assistance  - Assess pain using appropriate pain scale  - Administer analgesics based on type and severity of pain and evaluate response  - Implement non-pharmacological measures as appropriate and evaluate response  - Consider cultural and social influences on pain and pain management  - Notify physician/advanced practitioner if interventions unsuccessful or patient reports new pain  Outcome: Progressing     Problem: Knowledge Deficit  Goal: Patient/family/caregiver demonstrates understanding of disease process, treatment plan, medications, and discharge instructions  Description: Complete learning assessment and assess knowledge base    Interventions:  - Provide teaching at level of understanding  - Provide teaching via preferred learning methods  Outcome: Progressing

## 2020-08-17 NOTE — DISCHARGE INSTRUCTIONS
Gallstones   WHAT YOU NEED TO KNOW:   Gallstones, also called cholelithiasis, are hard substances that form in your gallbladder or bile duct  Your gallbladder and bile duct are located on the right side of your abdomen, near your liver  Your gallbladder stores bile, which helps break down the fat that you eat  Your gallbladder also helps remove certain chemicals from your body  DISCHARGE INSTRUCTIONS:   Medicines:   · Prescription pain medicine  may be given  Ask your healthcare provider how to take this medicine safely  · Take your medicine as directed  Contact your healthcare provider if you think your medicine is not helping or if you have side effects  Tell him if you are allergic to any medicine  Keep a list of the medicines, vitamins, and herbs you take  Include the amounts, and when and why you take them  Bring the list or the pill bottles to follow-up visits  Carry your medicine list with you in case of an emergency  Follow up with your healthcare provider as directed:  Write down your questions so you remember to ask them during your visits  Eat a variety of healthy foods: This may help you have more energy and heal faster  Healthy foods include fruit, vegetables, whole-grain breads, low-fat dairy products, beans, lean meat, and fish  Ask if you need to be on a special diet  Exercise as directed:  Talk to your healthcare provider about the best exercise plan for you  Exercise can help you lose weight and improve your health  Contact your healthcare provider if:   · You have nausea and are vomiting  · Your urine is dark  · You have josee-colored bowel movements  · You have questions or concerns about your condition or care  Seek care immediately or call 911 if:   · You have a fever and chills  · Your skin or eyes turn yellow  · You have severe pain in your upper abdomen, just below the right ribcage    © 2017 [employer] Information is for End User's use only and may not be sold, redistributed or otherwise used for commercial purposes  All illustrations and images included in CareNotes® are the copyrighted property of A D A M [employer]  or Gurpreet Bain  The above information is an  only  It is not intended as medical advice for individual conditions or treatments  Talk to your doctor, nurse or pharmacist before following any medical regimen to see if it is safe and effective for you

## 2020-08-21 NOTE — PHYSICIAN ADVISOR
Current patient class: Inpatient  The patient is currently on Hospital Day: 2 at [address] [facility]      The patient was admitted to the hospital  on 8/16/20 at [phone] for the following diagnosis:  Cholelithiasis [K80 20]  Chest pain [R07 9]     After review of the relevant documentation, labs, vital signs and test results, this is a PROVIDER LIABLE case  In this particular case the patient was admitted to the hospital as an inpatient  The patient however failed to satisfy the 2 midnight benchmark and closer scrutiny of the case was warranted  After review of the patient presentation and relevant labs the patient was most appropriate for observation or outpatient class at the time of admission  Given that this patient has already been discharged prior to this review they become a provider liable case  Rationale is as follows: The patient is a 81 yrs   Female who presented to the ED at 8/16/2020  1:02 PM with a chief complaint of Chest Pain (Constant midsternal cp radiating to back x 4 days  Took rolaids without relief )   Patient admitted and serial troponin negative for acs  She has been noncompliant with her HCTZ and bp elevated to 202/82 on admission  Her bp remained elevated 170-210's SBP  Sje was given IV hydralazine 10mg for 1 dose upon admission and started on hctz orally  Chest pain improved with rx of bp  She also had an us of gb showing cholelithiasis without acute cholecystitis  She was discharged to home with symptoms resolved with treatment of blood pressure elevation      The patients vitals on arrival were   ED Triage Vitals   Temperature Pulse Respirations Blood Pressure SpO2   08/16/20 1305 08/16/20 1305 08/16/20 1305 08/16/20 1305 08/16/20 1305   98 2 °F (36 8 °C) 85 18 (!) 202/82 95 %      Temp Source Heart Rate Source Patient Position - Orthostatic VS BP Location FiO2 (%)   08/16/20 1305 08/16/20 1305 08/16/20 1552 08/16/20 1305 --   Oral Monitor Lying Right arm Pain Score       08/16/20 1305       5           Past Medical History:   Diagnosis Date    Hypertension     Psoriasis      Past Surgical History:   Procedure Laterality Date    HYSTERECTOMY             Consults have been placed to:   None    Vitals:    08/16/20 1559 08/16/20 1710 08/16/20 1903 08/17/20 0700   BP: (!) 216/89 (!) 203/91 142/64 160/75   BP Location: Left arm Right arm  Left arm   Pulse:  66 76 67   Resp:  18 18 18   Temp:  98 7 °F (37 1 °C)  (!) 97 4 °F (36 3 °C)   TempSrc:  Oral  Oral   SpO2:  97% 95% 95%   Weight:  118 kg (260 lb)     Height:  5' 3" (1 6 m)         Most recent labs:    No results for input(s): WBC, HGB, HCT, PLT, K, NA, CALCIUM, BUN, CREATININE, LIPASE, AMYLASE, INR, TROPONINI, CKTOTAL, AST, ALT, ALKPHOS, BILITOT in the last 72 hours  Scheduled Meds:  Continuous Infusions:No current facility-administered medications for this encounter  PRN Meds:      Surgical procedures (if appropriate):

## 2024-02-14 ENCOUNTER — HOSPITAL ENCOUNTER (OUTPATIENT)
Facility: HOSPITAL | Age: 74
Setting detail: OUTPATIENT SURGERY
Discharge: HOME | End: 2024-02-15
Attending: EMERGENCY MEDICINE | Admitting: SURGERY
Payer: COMMERCIAL

## 2024-02-14 DIAGNOSIS — N39.0 UTI (URINARY TRACT INFECTION): ICD-10-CM

## 2024-02-14 DIAGNOSIS — R07.9 CHEST PAIN, UNSPECIFIED TYPE: Primary | ICD-10-CM

## 2024-02-14 DIAGNOSIS — K80.50 BILIARY COLIC: ICD-10-CM

## 2024-02-14 DIAGNOSIS — K80.20 GALLSTONE: ICD-10-CM

## 2024-02-14 PROCEDURE — 99285 EMERGENCY DEPT VISIT HI MDM: CPT

## 2024-02-15 ENCOUNTER — APPOINTMENT (EMERGENCY)
Dept: ULTRASOUND IMAGING | Facility: HOSPITAL | Age: 74
End: 2024-02-15
Payer: COMMERCIAL

## 2024-02-15 ENCOUNTER — ANESTHESIA (OUTPATIENT)
Dept: PERIOP | Facility: HOSPITAL | Age: 74
End: 2024-02-15
Payer: COMMERCIAL

## 2024-02-15 ENCOUNTER — ANESTHESIA EVENT (OUTPATIENT)
Dept: PERIOP | Facility: HOSPITAL | Age: 74
End: 2024-02-15
Payer: COMMERCIAL

## 2024-02-15 ENCOUNTER — APPOINTMENT (EMERGENCY)
Dept: RADIOLOGY | Facility: HOSPITAL | Age: 74
End: 2024-02-15
Payer: COMMERCIAL

## 2024-02-15 ENCOUNTER — APPOINTMENT (EMERGENCY)
Dept: CT IMAGING | Facility: HOSPITAL | Age: 74
End: 2024-02-15
Payer: COMMERCIAL

## 2024-02-15 VITALS
HEIGHT: 63 IN | DIASTOLIC BLOOD PRESSURE: 72 MMHG | TEMPERATURE: 97.5 F | WEIGHT: 243 LBS | OXYGEN SATURATION: 94 % | HEART RATE: 66 BPM | BODY MASS INDEX: 43.05 KG/M2 | SYSTOLIC BLOOD PRESSURE: 162 MMHG | RESPIRATION RATE: 20 BRPM

## 2024-02-15 LAB
2HR DELTA HS TROPONIN: 1 NG/L
ALBUMIN SERPL BCP-MCNC: 3.8 G/DL (ref 3.5–5)
ALP SERPL-CCNC: 60 U/L (ref 34–104)
ALT SERPL W P-5'-P-CCNC: 18 U/L (ref 7–52)
ANION GAP SERPL CALCULATED.3IONS-SCNC: 10 MMOL/L
APTT PPP: 31 SECONDS (ref 23–37)
AST SERPL W P-5'-P-CCNC: 19 U/L (ref 13–39)
ATRIAL RATE: 60 BPM
BACTERIA UR QL AUTO: ABNORMAL /HPF
BASOPHILS # BLD AUTO: 0.09 THOUSANDS/ÂΜL (ref 0–0.1)
BASOPHILS NFR BLD AUTO: 1 % (ref 0–1)
BILIRUB SERPL-MCNC: 0.57 MG/DL (ref 0.2–1)
BILIRUB UR QL STRIP: NEGATIVE
BNP SERPL-MCNC: 115 PG/ML (ref 0–100)
BUN SERPL-MCNC: 15 MG/DL (ref 5–25)
CALCIUM SERPL-MCNC: 9.4 MG/DL (ref 8.4–10.2)
CARDIAC TROPONIN I PNL SERPL HS: 8 NG/L
CARDIAC TROPONIN I PNL SERPL HS: 9 NG/L
CHLORIDE SERPL-SCNC: 100 MMOL/L (ref 96–108)
CLARITY UR: CLEAR
CO2 SERPL-SCNC: 28 MMOL/L (ref 21–32)
COLOR UR: ABNORMAL
CREAT SERPL-MCNC: 0.78 MG/DL (ref 0.6–1.3)
D DIMER PPP FEU-MCNC: 0.85 UG/ML FEU
EOSINOPHIL # BLD AUTO: 0.08 THOUSAND/ÂΜL (ref 0–0.61)
EOSINOPHIL NFR BLD AUTO: 1 % (ref 0–6)
ERYTHROCYTE [DISTWIDTH] IN BLOOD BY AUTOMATED COUNT: 15.9 % (ref 11.6–15.1)
GFR SERPL CREATININE-BSD FRML MDRD: 76 ML/MIN/1.73SQ M
GLUCOSE SERPL-MCNC: 186 MG/DL (ref 65–140)
GLUCOSE UR STRIP-MCNC: NEGATIVE MG/DL
HCT VFR BLD AUTO: 37.4 % (ref 34.8–46.1)
HGB BLD-MCNC: 11.7 G/DL (ref 11.5–15.4)
HGB UR QL STRIP.AUTO: ABNORMAL
IMM GRANULOCYTES # BLD AUTO: 0.1 THOUSAND/UL (ref 0–0.2)
IMM GRANULOCYTES NFR BLD AUTO: 1 % (ref 0–2)
INR PPP: 1 (ref 0.84–1.19)
KETONES UR STRIP-MCNC: NEGATIVE MG/DL
LEUKOCYTE ESTERASE UR QL STRIP: ABNORMAL
LIPASE SERPL-CCNC: 21 U/L (ref 11–82)
LYMPHOCYTES # BLD AUTO: 1.62 THOUSANDS/ÂΜL (ref 0.6–4.47)
LYMPHOCYTES NFR BLD AUTO: 12 % (ref 14–44)
MAGNESIUM SERPL-MCNC: 1.9 MG/DL (ref 1.9–2.7)
MCH RBC QN AUTO: 25.2 PG (ref 26.8–34.3)
MCHC RBC AUTO-ENTMCNC: 31.3 G/DL (ref 31.4–37.4)
MCV RBC AUTO: 80 FL (ref 82–98)
MONOCYTES # BLD AUTO: 0.4 THOUSAND/ÂΜL (ref 0.17–1.22)
MONOCYTES NFR BLD AUTO: 3 % (ref 4–12)
MUCOUS THREADS UR QL AUTO: ABNORMAL
NEUTROPHILS # BLD AUTO: 10.74 THOUSANDS/ÂΜL (ref 1.85–7.62)
NEUTS SEG NFR BLD AUTO: 82 % (ref 43–75)
NITRITE UR QL STRIP: NEGATIVE
NON-SQ EPI CELLS URNS QL MICRO: ABNORMAL /HPF
NRBC BLD AUTO-RTO: 0 /100 WBCS
P AXIS: 54 DEGREES
PH UR STRIP.AUTO: 7 [PH]
PLATELET # BLD AUTO: 310 THOUSANDS/UL (ref 149–390)
PLATELET # BLD AUTO: 327 THOUSANDS/UL (ref 149–390)
PMV BLD AUTO: 10 FL (ref 8.9–12.7)
PMV BLD AUTO: 9.9 FL (ref 8.9–12.7)
POTASSIUM SERPL-SCNC: 4.1 MMOL/L (ref 3.5–5.3)
PR INTERVAL: 146 MS
PROT SERPL-MCNC: 7.2 G/DL (ref 6.4–8.4)
PROT UR STRIP-MCNC: ABNORMAL MG/DL
PROTHROMBIN TIME: 13.8 SECONDS (ref 11.6–14.5)
QRS AXIS: 50 DEGREES
QRSD INTERVAL: 82 MS
QT INTERVAL: 426 MS
QTC INTERVAL: 426 MS
RBC # BLD AUTO: 4.65 MILLION/UL (ref 3.81–5.12)
RBC #/AREA URNS AUTO: ABNORMAL /HPF
SODIUM SERPL-SCNC: 138 MMOL/L (ref 135–147)
SP GR UR STRIP.AUTO: >=1.05 (ref 1–1.03)
T WAVE AXIS: 53 DEGREES
UROBILINOGEN UR STRIP-ACNC: <2 MG/DL
VENTRICULAR RATE: 60 BPM
WBC # BLD AUTO: 13.03 THOUSAND/UL (ref 4.31–10.16)
WBC #/AREA URNS AUTO: ABNORMAL /HPF

## 2024-02-15 PROCEDURE — 87186 SC STD MICRODIL/AGAR DIL: CPT

## 2024-02-15 PROCEDURE — G1004 CDSM NDSC: HCPCS

## 2024-02-15 PROCEDURE — 71045 X-RAY EXAM CHEST 1 VIEW: CPT

## 2024-02-15 PROCEDURE — 81001 URINALYSIS AUTO W/SCOPE: CPT

## 2024-02-15 PROCEDURE — 47562 LAPAROSCOPIC CHOLECYSTECTOMY: CPT

## 2024-02-15 PROCEDURE — 87086 URINE CULTURE/COLONY COUNT: CPT

## 2024-02-15 PROCEDURE — 80053 COMPREHEN METABOLIC PANEL: CPT

## 2024-02-15 PROCEDURE — 85025 COMPLETE CBC W/AUTO DIFF WBC: CPT

## 2024-02-15 PROCEDURE — 85730 THROMBOPLASTIN TIME PARTIAL: CPT | Performed by: EMERGENCY MEDICINE

## 2024-02-15 PROCEDURE — 74177 CT ABD & PELVIS W/CONTRAST: CPT

## 2024-02-15 PROCEDURE — 36415 COLL VENOUS BLD VENIPUNCTURE: CPT | Performed by: EMERGENCY MEDICINE

## 2024-02-15 PROCEDURE — 85610 PROTHROMBIN TIME: CPT | Performed by: EMERGENCY MEDICINE

## 2024-02-15 PROCEDURE — 83735 ASSAY OF MAGNESIUM: CPT | Performed by: EMERGENCY MEDICINE

## 2024-02-15 PROCEDURE — 87077 CULTURE AEROBIC IDENTIFY: CPT

## 2024-02-15 PROCEDURE — 71275 CT ANGIOGRAPHY CHEST: CPT

## 2024-02-15 PROCEDURE — 85049 AUTOMATED PLATELET COUNT: CPT

## 2024-02-15 PROCEDURE — 93005 ELECTROCARDIOGRAM TRACING: CPT

## 2024-02-15 PROCEDURE — 76705 ECHO EXAM OF ABDOMEN: CPT

## 2024-02-15 PROCEDURE — 96376 TX/PRO/DX INJ SAME DRUG ADON: CPT

## 2024-02-15 PROCEDURE — 96367 TX/PROPH/DG ADDL SEQ IV INF: CPT

## 2024-02-15 PROCEDURE — 88304 TISSUE EXAM BY PATHOLOGIST: CPT | Performed by: PATHOLOGY

## 2024-02-15 PROCEDURE — 99285 EMERGENCY DEPT VISIT HI MDM: CPT | Performed by: EMERGENCY MEDICINE

## 2024-02-15 PROCEDURE — 83690 ASSAY OF LIPASE: CPT | Performed by: EMERGENCY MEDICINE

## 2024-02-15 PROCEDURE — 93010 ELECTROCARDIOGRAM REPORT: CPT | Performed by: INTERNAL MEDICINE

## 2024-02-15 PROCEDURE — 96365 THER/PROPH/DIAG IV INF INIT: CPT

## 2024-02-15 PROCEDURE — 99222 1ST HOSP IP/OBS MODERATE 55: CPT | Performed by: SURGERY

## 2024-02-15 PROCEDURE — 85379 FIBRIN DEGRADATION QUANT: CPT | Performed by: EMERGENCY MEDICINE

## 2024-02-15 PROCEDURE — 83880 ASSAY OF NATRIURETIC PEPTIDE: CPT

## 2024-02-15 PROCEDURE — 84484 ASSAY OF TROPONIN QUANT: CPT

## 2024-02-15 PROCEDURE — 96375 TX/PRO/DX INJ NEW DRUG ADDON: CPT

## 2024-02-15 RX ORDER — FAMOTIDINE 10 MG/ML
20 INJECTION, SOLUTION INTRAVENOUS ONCE
Status: COMPLETED | OUTPATIENT
Start: 2024-02-15 | End: 2024-02-15

## 2024-02-15 RX ORDER — ACETAMINOPHEN 325 MG/1
650 TABLET ORAL EVERY 6 HOURS PRN
Status: DISCONTINUED | OUTPATIENT
Start: 2024-02-15 | End: 2024-02-15 | Stop reason: HOSPADM

## 2024-02-15 RX ORDER — HYDROMORPHONE HCL/PF 1 MG/ML
0.5 SYRINGE (ML) INJECTION ONCE
Status: COMPLETED | OUTPATIENT
Start: 2024-02-15 | End: 2024-02-15

## 2024-02-15 RX ORDER — LIDOCAINE HYDROCHLORIDE 10 MG/ML
INJECTION, SOLUTION EPIDURAL; INFILTRATION; INTRACAUDAL; PERINEURAL AS NEEDED
Status: DISCONTINUED | OUTPATIENT
Start: 2024-02-15 | End: 2024-02-15

## 2024-02-15 RX ORDER — MAGNESIUM HYDROXIDE 1200 MG/15ML
LIQUID ORAL AS NEEDED
Status: DISCONTINUED | OUTPATIENT
Start: 2024-02-15 | End: 2024-02-15 | Stop reason: HOSPADM

## 2024-02-15 RX ORDER — CEPHALEXIN 500 MG/1
500 CAPSULE ORAL EVERY 12 HOURS SCHEDULED
Qty: 14 CAPSULE | Refills: 0 | Status: SHIPPED | OUTPATIENT
Start: 2024-02-15 | End: 2024-02-19 | Stop reason: ALTCHOICE

## 2024-02-15 RX ORDER — ONDANSETRON 2 MG/ML
4 INJECTION INTRAMUSCULAR; INTRAVENOUS ONCE
Status: COMPLETED | OUTPATIENT
Start: 2024-02-15 | End: 2024-02-15

## 2024-02-15 RX ORDER — ROCURONIUM BROMIDE 10 MG/ML
INJECTION, SOLUTION INTRAVENOUS AS NEEDED
Status: DISCONTINUED | OUTPATIENT
Start: 2024-02-15 | End: 2024-02-15

## 2024-02-15 RX ORDER — SODIUM CHLORIDE 9 MG/ML
INJECTION, SOLUTION INTRAVENOUS AS NEEDED
Status: DISCONTINUED | OUTPATIENT
Start: 2024-02-15 | End: 2024-02-15 | Stop reason: HOSPADM

## 2024-02-15 RX ORDER — FENTANYL CITRATE/PF 50 MCG/ML
50 SYRINGE (ML) INJECTION
Status: DISCONTINUED | OUTPATIENT
Start: 2024-02-15 | End: 2024-02-15 | Stop reason: HOSPADM

## 2024-02-15 RX ORDER — KETOROLAC TROMETHAMINE 30 MG/ML
15 INJECTION, SOLUTION INTRAMUSCULAR; INTRAVENOUS ONCE
Status: COMPLETED | OUTPATIENT
Start: 2024-02-15 | End: 2024-02-15

## 2024-02-15 RX ORDER — FENTANYL CITRATE 50 UG/ML
INJECTION, SOLUTION INTRAMUSCULAR; INTRAVENOUS AS NEEDED
Status: DISCONTINUED | OUTPATIENT
Start: 2024-02-15 | End: 2024-02-15

## 2024-02-15 RX ORDER — DEXAMETHASONE SODIUM PHOSPHATE 10 MG/ML
INJECTION, SOLUTION INTRAMUSCULAR; INTRAVENOUS AS NEEDED
Status: DISCONTINUED | OUTPATIENT
Start: 2024-02-15 | End: 2024-02-15

## 2024-02-15 RX ORDER — SODIUM CHLORIDE, SODIUM LACTATE, POTASSIUM CHLORIDE, CALCIUM CHLORIDE 600; 310; 30; 20 MG/100ML; MG/100ML; MG/100ML; MG/100ML
100 INJECTION, SOLUTION INTRAVENOUS CONTINUOUS
Status: CANCELLED | OUTPATIENT
Start: 2024-02-15

## 2024-02-15 RX ORDER — OXYCODONE HYDROCHLORIDE 10 MG/1
10 TABLET ORAL EVERY 4 HOURS PRN
Status: DISCONTINUED | OUTPATIENT
Start: 2024-02-15 | End: 2024-02-15 | Stop reason: HOSPADM

## 2024-02-15 RX ORDER — CEFAZOLIN SODIUM 2 G/50ML
2000 SOLUTION INTRAVENOUS EVERY 8 HOURS
Status: DISCONTINUED | OUTPATIENT
Start: 2024-02-15 | End: 2024-02-15 | Stop reason: HOSPADM

## 2024-02-15 RX ORDER — METRONIDAZOLE 500 MG/100ML
500 INJECTION, SOLUTION INTRAVENOUS EVERY 8 HOURS
Status: DISCONTINUED | OUTPATIENT
Start: 2024-02-15 | End: 2024-02-15 | Stop reason: HOSPADM

## 2024-02-15 RX ORDER — BUPIVACAINE HYDROCHLORIDE 2.5 MG/ML
INJECTION, SOLUTION EPIDURAL; INFILTRATION; INTRACAUDAL AS NEEDED
Status: DISCONTINUED | OUTPATIENT
Start: 2024-02-15 | End: 2024-02-15 | Stop reason: HOSPADM

## 2024-02-15 RX ORDER — ONDANSETRON 2 MG/ML
INJECTION INTRAMUSCULAR; INTRAVENOUS AS NEEDED
Status: DISCONTINUED | OUTPATIENT
Start: 2024-02-15 | End: 2024-02-15

## 2024-02-15 RX ORDER — OXYCODONE HYDROCHLORIDE 5 MG/1
5 TABLET ORAL EVERY 4 HOURS PRN
Qty: 28 TABLET | Refills: 0 | Status: SHIPPED | OUTPATIENT
Start: 2024-02-15 | End: 2024-02-22

## 2024-02-15 RX ORDER — PROPOFOL 10 MG/ML
INJECTION, EMULSION INTRAVENOUS AS NEEDED
Status: DISCONTINUED | OUTPATIENT
Start: 2024-02-15 | End: 2024-02-15

## 2024-02-15 RX ORDER — HYDROMORPHONE HCL/PF 1 MG/ML
0.5 SYRINGE (ML) INJECTION
Status: DISCONTINUED | OUTPATIENT
Start: 2024-02-15 | End: 2024-02-15 | Stop reason: HOSPADM

## 2024-02-15 RX ORDER — SODIUM CHLORIDE, SODIUM GLUCONATE, SODIUM ACETATE, POTASSIUM CHLORIDE, MAGNESIUM CHLORIDE, SODIUM PHOSPHATE, DIBASIC, AND POTASSIUM PHOSPHATE .53; .5; .37; .037; .03; .012; .00082 G/100ML; G/100ML; G/100ML; G/100ML; G/100ML; G/100ML; G/100ML
500 INJECTION, SOLUTION INTRAVENOUS ONCE
Status: COMPLETED | OUTPATIENT
Start: 2024-02-15 | End: 2024-02-15

## 2024-02-15 RX ORDER — LEVOTHYROXINE SODIUM 0.07 MG/1
75 TABLET ORAL
Status: DISCONTINUED | OUTPATIENT
Start: 2024-02-15 | End: 2024-02-15 | Stop reason: HOSPADM

## 2024-02-15 RX ORDER — OXYCODONE HYDROCHLORIDE 5 MG/1
5 TABLET ORAL EVERY 4 HOURS PRN
Status: DISCONTINUED | OUTPATIENT
Start: 2024-02-15 | End: 2024-02-15 | Stop reason: HOSPADM

## 2024-02-15 RX ORDER — ONDANSETRON 2 MG/ML
4 INJECTION INTRAMUSCULAR; INTRAVENOUS ONCE AS NEEDED
Status: DISCONTINUED | OUTPATIENT
Start: 2024-02-15 | End: 2024-02-15 | Stop reason: HOSPADM

## 2024-02-15 RX ORDER — ENOXAPARIN SODIUM 100 MG/ML
40 INJECTION SUBCUTANEOUS DAILY
Status: DISCONTINUED | OUTPATIENT
Start: 2024-02-15 | End: 2024-02-15 | Stop reason: HOSPADM

## 2024-02-15 RX ORDER — ONDANSETRON 2 MG/ML
4 INJECTION INTRAMUSCULAR; INTRAVENOUS EVERY 6 HOURS PRN
Status: DISCONTINUED | OUTPATIENT
Start: 2024-02-15 | End: 2024-02-15 | Stop reason: HOSPADM

## 2024-02-15 RX ORDER — CEFAZOLIN SODIUM 1 G/3ML
INJECTION, POWDER, FOR SOLUTION INTRAMUSCULAR; INTRAVENOUS AS NEEDED
Status: DISCONTINUED | OUTPATIENT
Start: 2024-02-15 | End: 2024-02-15

## 2024-02-15 RX ORDER — METRONIDAZOLE 500 MG/100ML
INJECTION, SOLUTION INTRAVENOUS CONTINUOUS PRN
Status: DISCONTINUED | OUTPATIENT
Start: 2024-02-15 | End: 2024-02-15

## 2024-02-15 RX ORDER — KETOROLAC TROMETHAMINE 30 MG/ML
15 INJECTION, SOLUTION INTRAMUSCULAR; INTRAVENOUS ONCE
Status: DISCONTINUED | OUTPATIENT
Start: 2024-02-15 | End: 2024-02-15

## 2024-02-15 RX ORDER — SODIUM CHLORIDE, SODIUM GLUCONATE, SODIUM ACETATE, POTASSIUM CHLORIDE, MAGNESIUM CHLORIDE, SODIUM PHOSPHATE, DIBASIC, AND POTASSIUM PHOSPHATE .53; .5; .37; .037; .03; .012; .00082 G/100ML; G/100ML; G/100ML; G/100ML; G/100ML; G/100ML; G/100ML
125 INJECTION, SOLUTION INTRAVENOUS CONTINUOUS
Status: DISCONTINUED | OUTPATIENT
Start: 2024-02-15 | End: 2024-02-15 | Stop reason: HOSPADM

## 2024-02-15 RX ADMIN — HYDROMORPHONE HYDROCHLORIDE 0.5 MG: 1 INJECTION, SOLUTION INTRAMUSCULAR; INTRAVENOUS; SUBCUTANEOUS at 00:58

## 2024-02-15 RX ADMIN — ACETAMINOPHEN 650 MG: 325 TABLET, FILM COATED ORAL at 11:44

## 2024-02-15 RX ADMIN — SUGAMMADEX 200 MG: 100 INJECTION, SOLUTION INTRAVENOUS at 18:08

## 2024-02-15 RX ADMIN — DEXAMETHASONE SODIUM PHOSPHATE 10 MG: 10 INJECTION, SOLUTION INTRAMUSCULAR; INTRAVENOUS at 16:40

## 2024-02-15 RX ADMIN — SUGAMMADEX 200 MG: 100 INJECTION, SOLUTION INTRAVENOUS at 18:04

## 2024-02-15 RX ADMIN — FENTANYL CITRATE 50 MCG: 50 INJECTION INTRAMUSCULAR; INTRAVENOUS at 18:32

## 2024-02-15 RX ADMIN — METRONIDAZOLE 500 MG: 500 INJECTION, SOLUTION INTRAVENOUS at 11:14

## 2024-02-15 RX ADMIN — HYDROMORPHONE HYDROCHLORIDE 0.5 MG: 1 INJECTION, SOLUTION INTRAMUSCULAR; INTRAVENOUS; SUBCUTANEOUS at 02:48

## 2024-02-15 RX ADMIN — ONDANSETRON 4 MG: 2 INJECTION INTRAMUSCULAR; INTRAVENOUS at 17:30

## 2024-02-15 RX ADMIN — ROCURONIUM BROMIDE 50 MG: 10 INJECTION, SOLUTION INTRAVENOUS at 16:40

## 2024-02-15 RX ADMIN — LIDOCAINE HYDROCHLORIDE 100 MG: 10 INJECTION, SOLUTION EPIDURAL; INFILTRATION; INTRACAUDAL; PERINEURAL at 16:39

## 2024-02-15 RX ADMIN — SODIUM CHLORIDE, SODIUM GLUCONATE, SODIUM ACETATE, POTASSIUM CHLORIDE, MAGNESIUM CHLORIDE, SODIUM PHOSPHATE, DIBASIC, AND POTASSIUM PHOSPHATE 500 ML: .53; .5; .37; .037; .03; .012; .00082 INJECTION, SOLUTION INTRAVENOUS at 04:11

## 2024-02-15 RX ADMIN — CEFAZOLIN 2000 MG: 1 INJECTION, POWDER, FOR SOLUTION INTRAMUSCULAR; INTRAVENOUS at 16:51

## 2024-02-15 RX ADMIN — PROPOFOL 80 MG: 10 INJECTION, EMULSION INTRAVENOUS at 18:04

## 2024-02-15 RX ADMIN — CEFTRIAXONE SODIUM 1000 MG: 10 INJECTION, POWDER, FOR SOLUTION INTRAVENOUS at 08:24

## 2024-02-15 RX ADMIN — ONDANSETRON 4 MG: 2 INJECTION INTRAMUSCULAR; INTRAVENOUS at 00:59

## 2024-02-15 RX ADMIN — NITROGLYCERIN 0.5 INCH: 20 OINTMENT TOPICAL at 01:02

## 2024-02-15 RX ADMIN — SODIUM CHLORIDE, SODIUM GLUCONATE, SODIUM ACETATE, POTASSIUM CHLORIDE, MAGNESIUM CHLORIDE, SODIUM PHOSPHATE, DIBASIC, AND POTASSIUM PHOSPHATE 125 ML/HR: .53; .5; .37; .037; .03; .012; .00082 INJECTION, SOLUTION INTRAVENOUS at 13:25

## 2024-02-15 RX ADMIN — FENTANYL CITRATE 50 MCG: 50 INJECTION INTRAMUSCULAR; INTRAVENOUS at 16:39

## 2024-02-15 RX ADMIN — METRONIDAZOLE: 500 INJECTION, SOLUTION INTRAVENOUS at 16:59

## 2024-02-15 RX ADMIN — LEVOTHYROXINE SODIUM 75 MCG: 75 TABLET ORAL at 11:14

## 2024-02-15 RX ADMIN — ENOXAPARIN SODIUM 40 MG: 40 INJECTION SUBCUTANEOUS at 11:40

## 2024-02-15 RX ADMIN — IOHEXOL 100 ML: 350 INJECTION, SOLUTION INTRAVENOUS at 02:04

## 2024-02-15 RX ADMIN — CEFAZOLIN SODIUM 2000 MG: 2 SOLUTION INTRAVENOUS at 12:16

## 2024-02-15 RX ADMIN — PROPOFOL 200 MG: 10 INJECTION, EMULSION INTRAVENOUS at 16:39

## 2024-02-15 RX ADMIN — FENTANYL CITRATE 50 MCG: 50 INJECTION INTRAMUSCULAR; INTRAVENOUS at 19:04

## 2024-02-15 RX ADMIN — FAMOTIDINE 20 MG: 10 INJECTION INTRAVENOUS at 01:04

## 2024-02-15 RX ADMIN — KETOROLAC TROMETHAMINE 15 MG: 30 INJECTION, SOLUTION INTRAMUSCULAR; INTRAVENOUS at 02:49

## 2024-02-15 NOTE — DISCHARGE INSTR - AVS FIRST PAGE
Please call the office when you leave to schedule an appointment for 2 weeks for Dr Harley               Please call [phone]. [address], [address], [place], [facility]. Off of [address] between [place] and [address].     Activity:    May lift 10 lb as many times as desired the 1st week,       20 lb in 2 weeks,       30 lb in 3 weeks.                Walking is encouraged  Normal daily activities including climbing steps are okay  Do not engage in strenuous activity ( sit-ups or crutches) or contact sports for 4-6 weeks post-operatively    Return to Work:   Okay to return to work when you feel well if you desire.        Diet:   You may return to your normal healthy diet.    Wound Care:  Your wound is closed with dissolvable stitches and glue.  It is okay to shower. Wash incision gently with soap and water and pat dry. Do not soak incisions in bath water or swim for two weeks. Do not apply any creams or ointments.    Pain Medication:   Please take as directed if needed. May use Advil or Motrin in addition.  Recall, the pain medicine and anesthesia is associated with constipation.    No driving while taking narcotic pain medications.      Other:  It is normal to developed a “healing ridge” / firm incision after surgery.  This is your body making scar tissue.  It is a good sign  Constipation is very common after general anesthesia.  Please use milk of magnesia as needed in order to help prevent constipation.  It is normal to get bruising after surgery.  If you have questions after discharge please call the office.    If you have increased pain, fever >101.5, increased drainage, redness or a bad smell at your surgery site, please call us immediately or come directly to the Emergency Room    
Skin normal color for race, warm, dry and intact. No evidence of rash.

## 2024-02-15 NOTE — ANESTHESIA POSTPROCEDURE EVALUATION
Post-Op Assessment Note    CV Status:  Stable    Pain management: adequate       Mental Status:  Awake and sleepy   Hydration Status:  Euvolemic   PONV Controlled:  Controlled   Airway Patency:  Patent  Airway: intubated     Post Op Vitals Reviewed: Yes    No anethesia notable event occurred.    Staff: CRNA               BP   143/65   Temp      Pulse  76   Resp 15   SpO2 96

## 2024-02-15 NOTE — ED PROVIDER NOTES
"History  Chief Complaint   Patient presents with    Chest Pain     Pt states she started with chest pain around 5pm tonight while she was sitting. Took 2 baby aspirin without relief. +SOB. Denies dizziness/headache/nausea/vomiting.     Isatu is a 73-year-old female with past medical history significant for hypertension who presents to the emergency department after experiencing persistence of chest discomfort (patient self describes this pain as \"agida\").  She states that this had begun at approximately 5 PM on 2/14/2024.  She states that she thought this was initially associated with indigestion as well as belching.  She states that she does not normally experience significant belching which she felt was atypical for presentations for her in the past.  She denies any recent changes in medications at home.  She felt that the pain had persisted throughout the course of the evening and began to increase in intensity which had prompted her to seek care in the emergency department.  Denies any previous history of cardiac issues.  She thought that this could be related to her heart and took 2 baby aspirin (81 mg) for assessment and evaluation of symptoms.  She denies any recent sick contacts.  No cough, congestion, fevers, chills at home.  Patient denies any nausea, vomiting, episodes of diaphoresis, sweating through close, back pain, shoulder pain, or neck pain.  She denies any abdominal discomfort.  She denies any changes in urination or bowel movements.    Secondary to the persistence of the symptoms, patient wished to be evaluated in the emergency department secondary to the persistence of her chest discomfort.      History provided by:  Patient   used: No        Prior to Admission Medications   Prescriptions Last Dose Informant Patient Reported? Taking?   acetaminophen (TYLENOL) 325 mg tablet   No No   Sig: Take 2 tablets (650 mg total) by mouth every 6 (six) hours as needed for mild pain, " headaches or fever   hydrochlorothiazide (HYDRODIURIL) 25 mg tablet   No No   Sig: Take 1 tablet (25 mg total) by mouth daily   levothyroxine 75 mcg tablet   Yes No   Sig: Take by mouth daily      Facility-Administered Medications: None       Past Medical History:   Diagnosis Date    Hypertension     Psoriasis        Past Surgical History:   Procedure Laterality Date    HYSTERECTOMY         History reviewed. No pertinent family history.  I have reviewed and agree with the history as documented.    E-Cigarette/Vaping    E-Cigarette Use Never User      E-Cigarette/Vaping Substances    Nicotine No     THC No     CBD No      Social History     Tobacco Use    Smoking status: Never    Smokeless tobacco: Never   Vaping Use    Vaping status: Never Used   Substance Use Topics    Alcohol use: Never    Drug use: Never        Review of Systems   Constitutional:  Negative for chills and fever.   HENT:  Negative for ear pain and sore throat.    Eyes:  Negative for pain and visual disturbance.   Respiratory:  Negative for cough and shortness of breath.    Cardiovascular:  Negative for chest pain and palpitations.   Gastrointestinal:  Negative for abdominal pain and vomiting.   Genitourinary:  Negative for dysuria and hematuria.   Musculoskeletal:  Negative for arthralgias and back pain.   Skin:  Negative for color change and rash.   Neurological:  Negative for seizures and syncope.   All other systems reviewed and are negative.      Physical Exam  ED Triage Vitals   Temperature Pulse Respirations Blood Pressure SpO2   02/15/24 0003 02/15/24 0000 02/15/24 0000 02/15/24 0000 02/15/24 0000   98.5 °F (36.9 °C) 67 18 (!) 198/78 98 %      Temp Source Heart Rate Source Patient Position - Orthostatic VS BP Location FiO2 (%)   02/15/24 0003 02/15/24 0000 02/15/24 0000 02/15/24 0000 --   Oral Monitor Lying Right arm       Pain Score       02/15/24 0343       No Pain             Orthostatic Vital Signs  Vitals:    02/15/24 0430 02/15/24 0500  02/15/24 0704 02/15/24 0826   BP: 153/66 122/55 127/58 144/67   Pulse: 76 71 68 69   Patient Position - Orthostatic VS:   Sitting Lying       Physical Exam  Vitals and nursing note reviewed.   Constitutional:       Appearance: She is well-developed. She is obese. She is not ill-appearing or diaphoretic.   HENT:      Head: Normocephalic and atraumatic.   Eyes:      Extraocular Movements: Extraocular movements intact.      Conjunctiva/sclera: Conjunctivae normal.      Pupils: Pupils are equal, round, and reactive to light.   Neck:      Thyroid: No thyromegaly.   Cardiovascular:      Rate and Rhythm: Normal rate and regular rhythm.      Heart sounds: Normal heart sounds. Heart sounds not distant. No murmur heard.     No S3 or S4 sounds.   Pulmonary:      Effort: Pulmonary effort is normal. No respiratory distress.      Breath sounds: Normal breath sounds. No decreased breath sounds, wheezing or rhonchi.   Chest:      Chest wall: No mass, deformity or tenderness.   Abdominal:      Palpations: Abdomen is soft. There is no mass.      Tenderness: There is abdominal tenderness. There is no guarding.      Comments: Epigastric discomfort palpation   Musculoskeletal:         General: No swelling.      Cervical back: Normal range of motion and neck supple.      Right lower leg: No tenderness. No edema.      Left lower leg: No tenderness. No edema.   Skin:     General: Skin is warm and dry.      Capillary Refill: Capillary refill takes less than 2 seconds.      Coloration: Skin is not cyanotic.      Findings: No ecchymosis or erythema.   Neurological:      General: No focal deficit present.      Mental Status: She is alert and oriented to person, place, and time.      Motor: No weakness.   Psychiatric:         Mood and Affect: Mood normal.         ED Medications  Medications   nitroglycerin (NITRO-BID) 2 % TD ointment 0.5 inch (0.5 inches Topical Given 2/15/24 0102)   HYDROmorphone (DILAUDID) injection 0.5 mg (0.5 mg Intravenous  Given 2/15/24 0058)   ondansetron (ZOFRAN) injection 4 mg (4 mg Intravenous Given 2/15/24 0059)   Famotidine (PF) (PEPCID) injection 20 mg (20 mg Intravenous Given 2/15/24 0104)   iohexol (OMNIPAQUE) 350 MG/ML injection (MULTI-DOSE) 100 mL (100 mL Intravenous Given 2/15/24 0204)   HYDROmorphone (DILAUDID) injection 0.5 mg (0.5 mg Intravenous Given 2/15/24 0248)   ketorolac (TORADOL) injection 15 mg (15 mg Intravenous Given 2/15/24 0249)   multi-electrolyte (PLASMALYTE-A/ISOLYTE-S PH 7.4) IV solution 500 mL (0 mL Intravenous Stopped 2/15/24 0530)   ceftriaxone (ROCEPHIN) 1 g/50 mL in dextrose IVPB (1,000 mg Intravenous New Bag 2/15/24 0824)       Diagnostic Studies  Results Reviewed       Procedure Component Value Units Date/Time    Urine Microscopic [[id number]]  (Abnormal) Collected: 02/15/24 0542    Lab Status: Final result Specimen: Urine, Clean Catch Updated: 02/15/24 0729     RBC, UA 1-2 /hpf      WBC, UA 10-20 /hpf      Epithelial Cells Occasional /hpf      Bacteria, UA Occasional /hpf      MUCUS THREADS Occasional    Urine culture [[id number]] Collected: 02/15/24 0542    Lab Status: In process Specimen: Urine, Clean Catch Updated: 02/15/24 0729    UA w Reflex to Microscopic w Reflex to Culture [[id number]]  (Abnormal) Collected: 02/15/24 0542    Lab Status: Final result Specimen: Urine, Clean Catch Updated: 02/15/24 0631     Color, UA Light Yellow     Clarity, UA Clear     Specific Gravity, UA >=1.050     pH, UA 7.0     Leukocytes, UA Trace     Nitrite, UA Negative     Protein, UA Trace mg/dl      Glucose, UA Negative mg/dl      Ketones, UA Negative mg/dl      Urobilinogen, UA <2.0 mg/dl      Bilirubin, UA Negative     Occult Blood, UA Trace    HS Troponin I 2hr [[id number]]  (Normal) Collected: 02/15/24 0332    Lab Status: Final result Specimen: Blood from Arm, Right Updated: 02/15/24 0408     hs TnI 2hr 9 ng/L      Delta 2hr hsTnI 1 ng/L     B-Type Natriuretic Peptide(BNP) [[id number]]  (Abnormal) Collected:  02/15/24 0106    Lab Status: Final result Specimen: Blood from Arm, Right Updated: 02/15/24 0144      pg/mL     HS Troponin 0hr (reflex protocol) [[id number]]  (Normal) Collected: 02/15/24 0106    Lab Status: Final result Specimen: Blood from Arm, Right Updated: 02/15/24 0138     hs TnI 0hr 8 ng/L     D-Dimer [[id number]]  (Abnormal) Collected: 02/15/24 0106    Lab Status: Final result Specimen: Blood from Arm, Right Updated: 02/15/24 0135     D-Dimer, Quant 0.85 ug/ml FEU     Narrative:      In the evaluation for possible pulmonary embolism, in the appropriate (Well's Score of 4 or less) patient, the age adjusted d-dimer cutoff for this patient can be calculated as:    Age x 0.01 (in ug/mL) for Age-adjusted D-dimer exclusion threshold for a patient over 50 years.    Lipase [[id number]]  (Normal) Collected: 02/15/24 0106    Lab Status: Final result Specimen: Blood from Arm, Right Updated: 02/15/24 0134     Lipase 21 u/L     Magnesium [[id number]]  (Normal) Collected: 02/15/24 0106    Lab Status: Final result Specimen: Blood from Arm, Right Updated: 02/15/24 0134     Magnesium 1.9 mg/dL     Comprehensive metabolic panel [[id number]]  (Abnormal) Collected: 02/15/24 0106    Lab Status: Final result Specimen: Blood from Arm, Right Updated: 02/15/24 0134     Sodium 138 mmol/L      Potassium 4.1 mmol/L      Chloride 100 mmol/L      CO2 28 mmol/L      ANION GAP 10 mmol/L      BUN 15 mg/dL      Creatinine 0.78 mg/dL      Glucose 186 mg/dL      Calcium 9.4 mg/dL      AST 19 U/L      ALT 18 U/L      Alkaline Phosphatase 60 U/L      Total Protein 7.2 g/dL      Albumin 3.8 g/dL      Total Bilirubin 0.57 mg/dL      eGFR 76 ml/min/1.73sq m     Narrative:      [employer] guidelines for Chronic Kidney Disease (CKD):     Stage 1 with normal or high GFR (GFR > 90 mL/min/1.73 square meters)    Stage 2 Mild CKD (GFR = 60-89 mL/min/1.73 square meters)    Stage 3A Moderate CKD (GFR = 45-59 mL/min/1.73 square  meters)    Stage 3B Moderate CKD (GFR = 30-44 mL/min/1.73 square meters)    Stage 4 Severe CKD (GFR = 15-29 mL/min/1.73 square meters)    Stage 5 End Stage CKD (GFR <15 mL/min/1.73 square meters)  Note: GFR calculation is accurate only with a steady state creatinine    Protime-INR [[id number]]  (Normal) Collected: 02/15/24 0106    Lab Status: Final result Specimen: Blood from Arm, Right Updated: 02/15/24 0130     Protime 13.8 seconds      INR 1.00    APTT [[id number]]  (Normal) Collected: 02/15/24 0106    Lab Status: Final result Specimen: Blood from Arm, Right Updated: 02/15/24 0130     PTT 31 seconds     CBC and differential [[id number]]  (Abnormal) Collected: 02/15/24 0106    Lab Status: Final result Specimen: Blood from Arm, Right Updated: 02/15/24 0115     WBC 13.03 Thousand/uL      RBC 4.65 Million/uL      Hemoglobin 11.7 g/dL      Hematocrit 37.4 %      MCV 80 fL      MCH 25.2 pg      MCHC 31.3 g/dL      RDW 15.9 %      MPV 10.0 fL      Platelets 310 Thousands/uL      nRBC 0 /100 WBCs      Neutrophils Relative 82 %      Immat GRANS % 1 %      Lymphocytes Relative 12 %      Monocytes Relative 3 %      Eosinophils Relative 1 %      Basophils Relative 1 %      Neutrophils Absolute 10.74 Thousands/µL      Immature Grans Absolute 0.10 Thousand/uL      Lymphocytes Absolute 1.62 Thousands/µL      Monocytes Absolute 0.40 Thousand/µL      Eosinophils Absolute 0.08 Thousand/µL      Basophils Absolute 0.09 Thousands/µL                    US right upper quadrant   ED Interpretation by Robbie Marsh MD (02/15 0831)   FINDINGS:     PANCREAS: Visualized portions of the pancreas are within normal limits.     AORTA AND IVC: Visualized portions are normal for patient age.     LIVER:  Size: Enlarged. The liver measures 19.6 cm in the midclavicular line.  Contour: Surface contour is smooth.  Parenchyma: Echogenic  No liver mass identified.  Limited imaging of the main portal vein shows it to be patent and  hepatopetal.     BILIARY:  Nonmobile stone in the neck, 2.6 cm. Otherwise unremarkable. Negative sonographic Villegas sign  No intrahepatic biliary dilatation.  CBD measures 4.0 mm.  No choledocholithiasis.     KIDNEY:  Right kidney measures 11.5 x 4.1 x 5.2 cm. Volume 129.2 mL  Kidney within normal limits.     ASCITES: None.     IMPRESSION:  No mobile gallstone in the neck of the gallbladder.  No sonographic signs of cholecystitis.  Hepatomegaly. Echogenicity suggestive of hepatic steatosis.  No biliary dilatation.     Workstation performed: [id number]        Final Result by Joy Hawley MD (02/15 0858)   Addendum (preliminary) 1 of 1 by Joy Hawley MD (02/15 0858)   ADDENDUM:   Voice recognition error in the conclusion of the study. The corrected    sentence should read:   Nonmobile gallstone in the neck of the gallbladder.      Discussed with the ordering provider.      Final   No mobile gallstone in the neck of the gallbladder.   No sonographic signs of cholecystitis.   Hepatomegaly. Echogenicity suggestive of hepatic steatosis.   No biliary dilatation.      Workstation performed: [id number]         CT pe study w abdomen pelvis w contrast   ED Interpretation by Robbie Marsh MD (02/15 0245)   FINDINGS:     CHEST     PULMONARY ARTERIAL TREE: No pulmonary embolus is seen.     LUNGS: Central airways are patent. No tracheal or endobronchial lesion. Mild bibasilar atelectasis. No lobar airspace consolidation or pulmonary contusions. Nonspecific 5 mm lingular groundglass nodule and 6 mm subpleural groundglass nodule in the right   middle lobe. Recommend follow-up CT chest in 3 to 6 months per current Fleischner Society guidelines.     PLEURA: Unremarkable without pneumothorax or pleural effusions.     HEART/AORTA: Heart is unremarkable for patient's age. No pericardial effusion. No thoracic aortic aneurysm or dissection. Visualized proximal thoracic vessels are patent with normal course and  caliber     MEDIASTINUM AND LEXIE: No mediastinal mass/fluid collection or lymphadenopathy. Asymmetric enlargement of the right thyroid lobe containing heterogeneous density mass/nodule again noted. Esophagus is normal in course and caliber. No significant   prevertebral soft tissue swelling o   r mass.     CHEST WALL AND LOWER NECK: Mild multilevel degenerative changes of the thoracic spine. No acute fracture, subluxation, or destructive bone lesions. No subcutaneous soft tissue mass or fluid collections. Nonspecific bilateral axillary lymph nodes are   seen.     ABDOMEN     LIVER/BILIARY TREE: Unremarkable.     GALLBLADDER: Cholelithiasis without findings of acute cholecystitis.     SPLEEN: Unremarkable.     PANCREAS: Unremarkable.     ADRENAL GLANDS: Unremarkable.     KIDNEYS/URETERS: Kidneys are normal in size and position. Small left parapelvic renal cysts, exophytic left upper pole renal cyst, and too small to characterize right mid to lower pole hypodense foci are again seen. These are not significantly changed   compared to the prior study from 2020. No suspicious solid renal mass, nephrolithiasis, hydronephrosis, or perinephric fluid collections bilaterally..     STOMACH AND BOWEL: Stomach is moderately distended with heterogeneous density debris. The small and large    bowel loops are in course and caliber without obstruction or inflammation. Terminal ileum and appendix appear grossly unremarkable.     APPENDIX: No findings to suggest appendicitis.     ABDOMINOPELVIC CAVITY: No ascites. No pneumoperitoneum. No lymphadenopathy.     VESSELS: Atherosclerosis without abdominal aortic aneurysm.     PELVIS     REPRODUCTIVE ORGANS: Post hysterectomy.     URINARY BLADDER: Unremarkable.     ABDOMINAL WALL/INGUINAL REGIONS: Unremarkable.     BONES: No acute fracture or suspicious osseous lesion. Multilevel degenerative changes of the thoracolumbar spine and bilateral hip joints.     IMPRESSION:     No acute pulmonary  embolism or thoracic aortic aneurysm/dissection. Stable enlarged right thyroid lobe with ill-defined heterogeneous density mass again seen. Consider nonemergent thyroid ultrasound for better evaluation.  Nonspecific 5 mm lingular   groundglass nodule and 6 mm subpleural groundglass nodule in the right middle lobe. Recommend follow-up CT chest in    3 to 6 months per current Fleischner Society guidelines.     Cholelithiasis without evidence for acute cholecystitis or significant biliary ductal dilatation. Otherwise no acute intra-abdominal/pelvic abnormalities noted with findings detailed above.        Workstation performed: [id number]        Final Result by Gustabo Wolfe MD (02/15 0242)      No acute pulmonary embolism or thoracic aortic aneurysm/dissection. Stable enlarged right thyroid lobe with ill-defined heterogeneous density mass again seen. Consider nonemergent thyroid ultrasound for better evaluation.  Nonspecific 5 mm lingular    groundglass nodule and 6 mm subpleural groundglass nodule in the right middle lobe. Recommend follow-up CT chest in 3 to 6 months per current Fleischner Society guidelines.      Cholelithiasis without evidence for acute cholecystitis or significant biliary ductal dilatation. Otherwise no acute intra-abdominal/pelvic abnormalities noted with findings detailed above.         Workstation performed: [id number]         XR chest 1 view   ED Interpretation by Robbie Marsh MD (02/15 0119)   No acute cardiopulmonary pathology appreciated on independent assessment and read of chest radiograph.  Trachea appears to be midline.  Clavicles level and do not appear to be rotated.  Exam is limited secondary to soft tissue presence as well as body habitus.  Buttons and ECG tracing leads able to be appreciated and seen on my examination.  Bilateral costophrenic angles can be seen.  No focal consolidations, opacities, or effusions noted.  No gross enlargement of cardiac silhouette when  compared to full diameter of thoracic cavity.  Grossly unremarkable chest x-ray independently read and assessed by Robbie Marsh.            Procedures  ECG 12 Lead Documentation Only    Date/Time: 2/15/2024 1:44 AM    Performed by: Robbie Marsh MD  Authorized by: Robbie Marsh MD    Indications / Diagnosis:  Chest pain  ECG reviewed by me, the ED Provider: yes    Patient location:  ED  Previous ECG:     Previous ECG:  Unavailable    Comparison to cardiac monitor: No    Interpretation:     Interpretation: abnormal    Quality:     Tracing quality:  Limited by artifact  Rate:     ECG rate:  60    ECG rate assessment: normal    Rhythm:     Rhythm: sinus rhythm    Ectopy:     Ectopy: PVCs      PVCs:  Frequent  QRS:     QRS axis:  Normal    QRS intervals:  Normal  Conduction:     Conduction: normal    ST segments:     ST segments:  Normal  T waves:     T waves: normal    Comments:      QTc 426        ED Course  ED Course as of 02/15/24 0909   Thu Feb 15, 2024   0117 WBC(!): 13.03   0117 MCV(!): 80   0138 D-Dimer, Quant(!): 0.85  Elevated D-dimer.  Will proceed with CT PE of the chest/abdomen/pelvis.   0257 CT pe study w abdomen pelvis w contrast  IMPRESSION:      No acute pulmonary embolism or thoracic aortic aneurysm/dissection. Stable enlarged right thyroid lobe with ill-defined heterogeneous density mass again seen. Consider nonemergent thyroid ultrasound for better evaluation.  Nonspecific 5 mm lingular   groundglass nodule and 6 mm subpleural groundglass nodule in the right middle lobe. Recommend follow-up CT chest in    3 to 6 months per current Fleischner Society guidelines.      Cholelithiasis without evidence for acute cholecystitis or significant biliary ductal dilatation. Otherwise no acute intra-abdominal/pelvic abnormalities noted with findings detailed above.                HEART Risk Score      Flowsheet Row Most Recent Value   Heart Score Risk Calculator    History 1  Filed at: 02/15/2024 0145   ECG 1 Filed at: 02/15/2024 0145   Age 2 Filed at: 02/15/2024 0145   Risk Factors 2 Filed at: 02/15/2024 0145   Troponin 0 Filed at: 02/15/2024 0145   HEART Score 6 Filed at: 02/15/2024 0145                        SBIRT 22yo+      Flowsheet Row Most Recent Value   Initial Alcohol Screen: US AUDIT-C     1. How often do you have a drink containing alcohol? 0 Filed at: 02/15/2024 0025   2. How many drinks containing alcohol do you have on a typical day you are drinking?  0 Filed at: 02/15/2024 0025   3a. Male UNDER 65: How often do you have five or more drinks on one occasion? 0 Filed at: 02/15/2024 0025   3b. FEMALE Any Age, or MALE 65+: How often do you have 4 or more drinks on one occassion? 0 Filed at: 02/15/2024 0025   Audit-C Score 0 Filed at: 02/15/2024 0025   VONDA: How many times in the past year have you...    Used an illegal drug or used a prescription medication for non-medical reasons? Never Filed at: 02/15/2024 0025            Wells' Criteria for PE      Flowsheet Row Most Recent Value   Wells' Criteria for PE    Clinical signs and symptoms of DVT 0 Filed at: 02/15/2024 0146   PE is primary diagnosis or equally likely 3 Filed at: 02/15/2024 0146   HR >100 0 Filed at: 02/15/2024 0146   Immobilization at least 3 days or Surgery in the previous 4 weeks 0 Filed at: 02/15/2024 0146   Previous, objectively diagnosed PE or DVT 0 Filed at: 02/15/2024 0146   Hemoptysis 0 Filed at: 02/15/2024 0146   Malignancy with treatment within 6 months or palliative 0 Filed at: 02/15/2024 0146   Wells' Criteria Total 3 Filed at: 02/15/2024 0146              Medical Decision Making  Isatu is a 73-year-old female who presents to the emergency department with persistence of chest discomfort that has not responded to administration of aspirin therapy at home.    DDX including but not limited to: ACS, MI, PE, PTX, pneumonia, dissection, pleurisy, pericarditis, myocarditis, rhabdomyolysis, GI etiology.      Based off of initial presenting complaints, laboratory evaluation, IV access, and imaging studies as well as ECG tracing were collected and sent for this patient.  Patient had received dosages of medication through the IV as well as Nitropaste with moderate improvement in symptomology.  After repeat administration of IV pain medication, patient had significant improvement/near resolution of chest discomfort.    Laboratory evaluation was notable for mild elevation in white blood cell count, urine analysis that appears consistent with a urinary tract infection (administered antibiotics while in the emergency department) as well as grossly unremarkable electrolytes and troponin assay.  D-dimer was elevated at prompted a CT PE study of the chest/abdomen and pelvis given the patient's epigastric discomfort on abdominal examination.  CT imaging was unremarkable for any pulmonary embolus but did comment on cholelithiasis without cholecystitis or biliary obstruction.    Given the location of the patient's discomfort as well as her description of events, potential for biliary colic being a presenting complaint for this patient, right upper quadrant ultrasonography was ordered for this patient with the anticipation of ultrasonography to be conducted while in the emergency department upon arrival of morning ultrasonography staff.    Based off initial laboratory evaluation I have lower suspicion for ACS or MI at this time.  Patient does not have a PE based off of CT imaging.  Nor does chest imaging indicate any pneumothorax or pneumonia.  I have no imaging nor laboratory nor physical examination findings that would support dissection, pleurisy, pericarditis, or myocarditis at this time.  We have higher suspicion that the patient's presenting complaints are more likely in the setting of a GI etiology prompting right upper quadrant ultrasonography.    Patient's right upper quadrant ultrasonography was notable for no acute  findings of cholecystitis but did indicate a presence of nonmobile gallstone in the neck of the gallbladder.  Based of the presence of this gallstone, this is most likely the presenting etiology/biliary colic patient's presenting pain or discomfort.  Based off of the presence of these findings, will proceed with communication with general surgery team for continued evaluation and assessment of patient.    [facility] for continued assessment and management of patient following general surgery recommendations.    Amount and/or Complexity of Data Reviewed  External Data Reviewed: ECG.     Details: I reviewed previous ECG tracing for better evaluation of patient's baseline evaluation today.  Labs: ordered. Decision-making details documented in ED Course.     Details: Laboratory evaluation notable for urinalysis that could be consistent with urinary tract infection.  Radiology: ordered and independent interpretation performed. Decision-making details documented in ED Course.     Details: CT imaging of chest/abdomen/pelvis negative for pulmonary embolism.  Finding of cholelithiasis without cholecystitis.  Ultrasonography of the right upper quadrant was also conducted notable for hepatomegaly.  No signs of cholecystitis.  ECG/medicine tests: ordered and independent interpretation performed.     Details: ECG tracing as documented in procedure section of this note.    Risk  Prescription drug management.          Disposition  Final diagnoses:   UTI (urinary tract infection)   Chest pain, unspecified type   Gallstone   Biliary colic     Time reflects when diagnosis was documented in both MDM as applicable and the Disposition within this note       Time User Action Codes Description Comment    2/15/2024  8:11 AM Robbie Marsh Add [N39.0] UTI (urinary tract infection)     2/15/2024  8:12 AM Robbie Marsh Add [R07.9] Chest pain, unspecified type      2/15/2024  8:37 AM Chalo Frey S Add [K80.20] Gallstone     2/15/2024  8:37 AM Chalo Frey Add [K80.50] Biliary colic     2/15/2024  8:37 AM Robbie Marsh Modify [N39.0] UTI (urinary tract infection)     2/15/2024  8:37 AM Robbie Marsh Modify [K80.50] Biliary colic     2/15/2024  8:37 AM Robbie Marsh Modify [R07.9] Chest pain, unspecified type     2/15/2024  8:37 AM Robbie Marsh Modify [K80.50] Biliary colic           ED Disposition       None          Follow-up Information       Follow up With Specialties Details Why Contact Info Additional Information    Catrachita Alcala MD Internal Medicine Schedule an appointment as soon as possible for a visit  As needed [address] [facility]  [address]  [facility] [id number] [phone]       [facility] Emergency Department Emergency Medicine  As needed, If symptoms worsen [address] [facility] [address]  [phone] [facility] Emergency Department, [address] [place], [place], [address]    [facility] Cardiology [facility] Cardiology Schedule an appointment as soon as possible for a visit   [address] [address] [phone] [facility] [address] [address], [place], [place], [address]   [phone]    Jann Harley MD General Surgery Schedule an appointment as soon as possible for a visit  As needed [address] [facility]  [address]  [facility] [id number] [phone]               Patient's Medications   Discharge Prescriptions    CEPHALEXIN (KEFLEX) 500 MG CAPSULE    Take 1 capsule (500 mg total) by mouth every 12 (twelve) hours for 7 days       Start Date: 2/15/2024 End Date: 2/22/2024       Order Dose: 500 mg       Quantity: 14 capsule    Refills: 0    OXYCODONE (ROXICODONE) 5 IMMEDIATE RELEASE TABLET    Take 1 tablet (5 mg total) by mouth every 4 (four) hours as needed for  moderate pain for up to 7 days Max Daily Amount: 30 mg       Start Date: 2/15/2024 End Date: 2/22/2024       Order Dose: 5 mg       Quantity: 28 tablet    Refills: 0         PDMP Review         Value Time User    PDMP Reviewed  Yes 2/15/2024 12:17 AM Chalo Frey MD             ED Provider  Attending physically available and evaluated Isatu Ruiz. I managed the patient along with the ED Attending.    Electronically Signed by           Robbie Marsh MD  02/15/24 0847       Robbie Marsh MD  02/15/24 0909

## 2024-02-15 NOTE — ED ATTENDING ATTESTATION
2/14/2024  I, Chalo Frey MD, saw and evaluated the patient. I have discussed the patient with the resident/non-physician practitioner and agree with the resident's/non-physician practitioner's findings, Plan of Care, and MDM as documented in the resident's/non-physician practitioner's note, except where noted. All available labs and Radiology studies were reviewed.  I was present for key portions of any procedure(s) performed by the resident/non-physician practitioner and I was immediately available to provide assistance.       At this point I agree with the current assessment done in the Emergency Department.  I have conducted an independent evaluation of this patient a history and physical is as follows: Patient is a 73 year old female with chest pain and sob tonight. No fever. No cough. No N/V. No radiation of pain. Has had prior hysterectomy. Has h/o lymphedema. Was last seen in this ED on 8/16/20 for hypertensive urgency. PMPAWARERX website checked on this patient and no Rx found. NCAT. Moist mucous membranes. No scleral icterus. Lungs clear. Heart regular without murmur. Nontender chest wall. (+) epigastric tenderness. Good bowel sounds. No rebound or guarding. (+) Bilateral LE edema. No rash noted. No jaundice. DDX including but not limited to: pneumonia, pleural effusion, CHF, PE, PTX, ACS, MI, asthma exacerbation, COPD exacerbation; doubt COVID 19; anemia, metabolic abnormality, renal failure.  DDx including but not limited to: appendicitis, gastroenteritis, gastritis, PUD, GERD, gastroparesis, hepatitis, pancreatitis, colitis, enteritis, diverticulitis, food poisoning, mesenteric adenitis, epiploic appendagitis, mesenteric panniculitis, mesenteric ischemia, IBD, IBS, ileus, bowel obstruction, volvulus, internal hernia, AAA, cholecystitis, biliary colic, choledocholithiasis, perforated viscus, tumor, splenic etiology, constipation, pelvic pathology, renal colic, pyelonephritis, UTI; doubt  dissection or rhabdomyolysis. Will check EKG, labs, CXR and CT.     ED Course         Critical Care Time  Procedures

## 2024-02-15 NOTE — PLAN OF CARE
Problem: Potential for Falls  Goal: Patient will remain free of falls  Description: INTERVENTIONS:  - Educate patient/family on patient safety including physical limitations  - Instruct patient to call for assistance with activity   - Consult OT/PT to assist with strengthening/mobility   - Keep Call bell within reach  - Keep bed low and locked with side rails adjusted as appropriate  - Keep care items and personal belongings within reach  - Initiate and maintain comfort rounds  - Make Fall Risk Sign visible to staff  - Offer Toileting every  Hours, in advance of need  - Initiate/Maintain alarm  - Obtain necessary fall risk management equipmen  - Apply yellow socks and bracelet for high fall risk patients  - Consider moving patient to room near nurses station  Outcome: Progressing

## 2024-02-15 NOTE — DISCHARGE INSTRUCTIONS
Return sooner to the Emergency Department if increased pain, swelling, numbness, weakness, fever, redness, vomiting.    Please follow-up with your primary care provider for continued evaluation and interval assessment of your chest pain/discomfort.      You have also been provided with an antibiotic for treatment of a urinary tract infection based off of your assessment today in the emergency department.    Please utilize the provided contact information to establish care with our cardiology team for further evaluation and interval assessment of your chest pain.

## 2024-02-15 NOTE — ANESTHESIA PREPROCEDURE EVALUATION
Procedure:  CHOLECYSTECTOMY LAPAROSCOPIC (Abdomen)    Relevant Problems   CARDIO   (+) Chest pain   (+) Hypertensive urgency      ENDO   (+) Hypothyroidism   Stable enlarged R Thyroid Lobe   BMI-43, Negative Troponins  Physical Exam    Airway    Mallampati score: III  TM Distance: >3 FB  Neck ROM: full     Dental   No notable dental hx     Cardiovascular  Rhythm: regular, Rate: normal    Pulmonary   Breath sounds clear to auscultation    Other Findings  post-pubertal.      Anesthesia Plan  ASA Score- 3     Anesthesia Type- general with ASA Monitors.         Additional Monitors:     Airway Plan: ETT.           Plan Factors-Exercise tolerance (METS): >4 METS.    Chart reviewed. EKG reviewed. Imaging results reviewed. Existing labs reviewed. Patient summary reviewed.    Patient is not a current smoker.              Induction- intravenous.    Postoperative Plan- Plan for postoperative opioid use.     Informed Consent- Anesthetic plan and risks discussed with patient.  I personally reviewed this patient with the CRNA. Discussed and agreed on the Anesthesia Plan with the CRNA..              Lab Results   Component Value Date    GLUC 186 (H) 02/15/2024    ALT 18 02/15/2024    AST 19 02/15/2024    BUN 15 02/15/2024    CALCIUM 9.4 02/15/2024     02/15/2024    CO2 28 02/15/2024    CREATININE 0.78 02/15/2024    HDL 55 08/17/2020    INR 1.00 02/15/2024    HCT 37.4 02/15/2024    HGB 11.7 02/15/2024    HGBA1C 6.5 (H) 10/19/2023    MG 1.9 02/15/2024     02/15/2024    K 4.1 02/15/2024    TRIG 94 08/17/2020    WBC 13.03 (H) 02/15/2024

## 2024-02-15 NOTE — ED CARE HANDOFF
Emergency Department Sign Out Note        Sign out and transfer of care from Tristan Marsh and [facility]. See Separate Emergency Department note.     The patient, Isatu Ruiz, was evaluated by the previous provider for epigastric discomfort.    Workup Completed:  Labs and CT which reveal presence of cholelithiasis.  Ultrasound revealed known mobile stone in the gallbladder neck.    ED Course / Workup Pending (followup):  Surgical consultation    Patient has been accepted to the surgical team.      HEART Risk Score      Flowsheet Row Most Recent Value   Heart Score Risk Calculator    History 1 Filed at: 02/15/2024 0145   ECG 1 Filed at: 02/15/2024 0145   Age 2 Filed at: 02/15/2024 0145   Risk Factors 2 Filed at: 02/15/2024 0145   Troponin 0 Filed at: 02/15/2024 0145   HEART Score 6 Filed at: 02/15/2024 0145                          Wells' Criteria for PE      Flowsheet Row Most Recent Value   Wells' Criteria for PE    Clinical signs and symptoms of DVT 0 Filed at: 02/15/2024 0146   PE is primary diagnosis or equally likely 3 Filed at: 02/15/2024 0146   HR >100 0 Filed at: 02/15/2024 0146   Immobilization at least 3 days or Surgery in the previous 4 weeks 0 Filed at: 02/15/2024 0146   Previous, objectively diagnosed PE or DVT 0 Filed at: 02/15/2024 0146   Hemoptysis 0 Filed at: 02/15/2024 0146   Malignancy with treatment within 6 months or palliative 0 Filed at: 02/15/2024 0146   Wells' Criteria Total 3 Filed at: 02/15/2024 0146                     Procedures  Medical Decision Making  Amount and/or Complexity of Data Reviewed  Labs: ordered.  Radiology: ordered and independent interpretation performed.    Risk  Prescription drug management.            Disposition  Final diagnoses:   UTI (urinary tract infection)   Chest pain, unspecified type   Gallstone   Biliary colic     Time reflects when diagnosis was documented in both MDM as applicable and the Disposition within this note       Time User Action Codes  Description Comment    2/15/2024  8:11 AM Robbie Marsh Add [N39.0] UTI (urinary tract infection)     2/15/2024  8:12 AM Robbie Marsh Add [R07.9] Chest pain, unspecified type     2/15/2024  8:37 AM ShanteChalo melgar S Add [K80.20] Gallstone     2/15/2024  8:37 AM Chalo Frey S Add [K80.50] Biliary colic     2/15/2024  8:37 AM Robbie Marsh Modify [N39.0] UTI (urinary tract infection)     2/15/2024  8:37 AM Robbie Marsh Modify [K80.50] Biliary colic     2/15/2024  8:37 AM Robbie Marsh Modify [R07.9] Chest pain, unspecified type     2/15/2024  8:37 AM Robbie Marsh Modify [K80.50] Biliary colic           ED Disposition       None          Follow-up Information       Follow up With Specialties Details Why Contact Info Additional Information    Catrachita Alcala MD Internal Medicine Schedule an appointment as soon as possible for a visit  As needed [address] [facility]  [address]  [facility] [address]  [phone]       [facility] Emergency Department Emergency Medicine  As needed, If symptoms worsen [address] [facility] [address]  [phone] [facility] Emergency Department, [address] [place], [place], [address]    [facility] Cardiology [facility] Cardiology Schedule an appointment as soon as possible for a visit   [address] [address] [phone] [facility] [address] [address], [place], [place], [address] [phone]    Jann Harley MD General Surgery Schedule an appointment as soon as possible for a visit  As needed [address] [facility]  [address]  [facility] [address]  [phone]             Patient's Medications   Discharge Prescriptions    CEPHALEXIN (KEFLEX) 500 MG CAPSULE    Take 1 capsule (500 mg total) by mouth every 12 (twelve) hours for 7 days       Start Date: 2/15/2024 End Date: 2/22/2024        Order Dose: 500 mg       Quantity: 14 capsule    Refills: 0    OXYCODONE (ROXICODONE) 5 IMMEDIATE RELEASE TABLET    Take 1 tablet (5 mg total) by mouth every 4 (four) hours as needed for moderate pain for up to 7 days Max Daily Amount: 30 mg       Start Date: 2/15/2024 End Date: 2/22/2024       Order Dose: 5 mg       Quantity: 28 tablet    Refills: 0            ED Provider  Electronically Signed by     Manasa Chew MD  02/15/24 1059

## 2024-02-15 NOTE — INCIDENTAL FINDINGS
The following findings require follow up:  Radiographic finding    Finding:   Stable enlarged right thyroid lobe with ill-defined heterogeneous density mass again seen.   Consider nonemergent thyroid ultrasound for better evaluation.    Nonspecific 5 mm lingular groundglass nodule and 6 mm subpleural groundglass nodule in the right middle lobe.   Recommend follow-up CT chest in 3 to 6 months per current Fleischner Society guidelines.     Follow up with PCP should be done within 1 month(s) to arrange recommended studies.    Discussed with patient at bedside.

## 2024-02-15 NOTE — OP NOTE
OPERATIVE REPORT  PATIENT NAME: Isatu Ruiz    :  1950  MRN: [id number]  Pt Location: AN OR ROOM 03    SURGERY DATE: 2/15/2024    Surgeons and Role:     * Jann Harley MD - Primary     * Diaz Brown MD - Assisting    Preop Diagnosis:  Acute calculus cholecystitis    Postop diagnosis  Acute calculus cholecystitis    Procedure(s):  CHOLECYSTECTOMY LAPAROSCOPIC    Specimen(s):  ID Type Source Tests Collected by Time Destination   1 :  Tissue Gallbladder TISSUE EXAM Jann Harley MD 2/15/2024  5:12 PM        Estimated Blood Loss:   Minimal    Drains:  * No LDAs found *    Anesthesia Type:   General    Operative Indications:  Gallstone [K80.20]    Independent, non-smoker, ASA 3, wound class II, BMI 43, weight 250, height 63  CARDIO   (+) Chest pain   (+) Hypertensive urgency       ENDO   (+) Hypothyroidism         Complications:   None    Procedure and Technique:  Isatu Ruiz is a 73 y.o. female was brought into the operative suite and identified visually and by arm band. The patient was placed in the supine position.  Careful attention towards positioning of extremities was completed.  After sterile prep and drape a timeout was completed. Athrombic pumps in place.  Antibiotics provided.    After instillation of local analgesia an incision was made at the umbilicus . With blunt dissection the peritoneum was identified.  This was pulled upward using Kocher clamps.  An incision was made.  Hemostat was used to bluntly puncture the peritoneum.  Intra-abdominal location was verified.  A trocar was then inserted. CO2 was then insufflated with a back pressure of 15. After Marcaine instillation at each additional site, additional trochars are placed under direct vision into the upper aspect of the abdomen.    Laparoscopic visualization revealed a normal liver and normal stomach.  No excess peritoneal fluid.       Gallbladder was distended and edematous.  There were patchy areas of gangrene.   This was decompressed using an aspirating needle.         The gallbladder was pulled with traction inferiorly, and the liver was pushed superior.  A dome down technique was completed using electrocautery. This technique carried down to the level of Miller's pouch.  Careful attention was made towards location of the right hepatic artery, cystic artery, common bile duct, right hepatic duct and the cystic duct.    Careful attention was made towards the critical anatomy at this region. The cystic duct was identified inserting into the base of the gallbladder.  Cystic artery was identified also inserting into the base of the gallbladder.  The cystic duct was then clipped ×3 and divided. The cystic artery was clipped ×3 and divided. The gallbladder was then removed from the liver bed with additional electrocautery.    The area was copiously irrigated. Hemostasis was assured.  The gallbladder was placed into an Endo-Catch bag, and was then removed through the umbilical incision.      Fascia was closed with 0 Vicryl suture.  The subcutaneous components were irrigated.  The subcuticular incisions were then closed. Histacryl was then applied. The patient was awakened from general anesthesia and transferred to the recovery room in stable condition. Sponge and instrument count correct ×2.  A postoperative deep briefing was completed.       I was present for the entire procedure.    Patient Disposition:  PACU         SIGNATURE: Jann Harley MD  DATE: February 15, 2024  TIME: 5:56 PM                 Spiral Flap Text: The defect edges were debeveled with a #15 scalpel blade.  Given the location of the defect, shape of the defect and the proximity to free margins a spiral flap was deemed most appropriate.  Using a sterile surgical marker, an appropriate rotation flap was drawn incorporating the defect and placing the expected incisions within the relaxed skin tension lines where possible. The area thus outlined was incised deep to adipose tissue with a #15 scalpel blade.  The skin margins were undermined to an appropriate distance in all directions utilizing iris scissors.

## 2024-02-15 NOTE — H&P
H&P: General surgery  Isatu Ruiz 73 y.o. female MRN: [id number]  Unit/Bed#: W -01 Encounter: [id number]        Assessment/Plan     Assessment:  Patient is a 73 y.o. female with pmhx of hypothyroidism and hypertension who presents with symptomatic cholelithiasis versus early cholecystitis.    Afebrile, VSS  WBC: 13; Hb.7; T. bili: 0.57; LFTs: WNL  CTAP and right upper quadrant ultrasound showed Cholelithiasis without evidence of cholecystitis with a large stone impacted in the neck of the gallbladder.    Plan:  -Admit to general surgery  - Plan for laparoscopic cholecystectomy today  -NPO/IVF  -IV antibiotics; Ancef/Flagyl  - Pain and nausea control as needed  - DVT prophylaxis    History of Present Illness     HPI:  Isatu Ruiz is a 73 y.o. female with pmhx of HTN and hypothyroidism who presents with acute onset upper abdominal/chest pain that started at 5 PM yesterday.  She reports the pain is sharp and crampy.  She denies any associated symptoms of nausea, vomiting, fevers, chills, shortness of breath, dysuria, constipation, or diarrhea.  She reports 1 episode of similar pain multiple years ago which was self resolving.  In the ED her pain did improve with some pain medication but recurred.  She denies use of antiplatelet or anticoagulation therapy.  Past surgical history includes hysterectomy..    Review of Systems  As above, otherwise negative  Consults    Historical Information   Past Medical History:   Diagnosis Date    Hypertension     Psoriasis      Past Surgical History:   Procedure Laterality Date    HYSTERECTOMY       Social History   Social History     Substance and Sexual Activity   Alcohol Use Never     Social History     Substance and Sexual Activity   Drug Use Never     Social History     Tobacco Use   Smoking Status Never   Smokeless Tobacco Never     Family History: non-contributory    Meds/Allergies   all medications and allergies reviewed  Allergies   Allergen Reactions     Metformin     Sulfa Antibiotics        Objective   First Vitals:   Blood Pressure: (!) 198/78 (02/15/24 0000)  Pulse: 67 (02/15/24 0000)  Temperature: 98.5 °F (36.9 °C) (02/15/24 0003)  Temp Source: Oral (02/15/24 0003)  Respirations: 18 (02/15/24 0000)  Weight - Scale: 110 kg (243 lb 6.2 oz) (02/15/24 0000)  SpO2: 98 % (02/15/24 0000)    Current Vitals:   Blood Pressure: 165/73 (02/15/24 1056)  Pulse: 71 (02/15/24 1056)  Temperature: 97.5 °F (36.4 °C) (02/15/24 1056)  Temp Source: Oral (02/15/24 0003)  Respirations: 18 (02/15/24 0826)  Weight - Scale: 110 kg (243 lb 6.2 oz) (02/15/24 0000)  SpO2: 96 % (02/15/24 1056)      Intake/Output Summary (Last 24 hours) at 2/15/2024 1310  Last data filed at 2/15/2024 0854  Gross per 24 hour   Intake 550 ml   Output --   Net 550 ml       Invasive Devices       Peripheral Intravenous Line  Duration             Peripheral IV 02/15/24 Right;Ventral (anterior) Forearm <1 day                    Physical Exam  General: NAD, obese  Skin: Warm, dry, anicteric  HEENT: Normocephalic, atraumatic  CV: RRR  Pulm:  Nonlabored breathing on room air  Abd: Soft, right upper quadrant tenderness, nondistended  MSK: Symmetric, no edema, no tenderness, no deformity  Neuro: AOx3, GCS 15      Lab Results: I have personally reviewed pertinent lab results.    Imaging: I have personally reviewed pertinent reports.   and I have personally reviewed pertinent films in PACS  EKG, Pathology, and Other Studies: I have personally reviewed pertinent reports.      Code Status: Level 1 - Full Code  Advance Directive and Living Will:      Power of :    POLST:

## 2024-02-18 LAB
BACTERIA UR CULT: ABNORMAL
BACTERIA UR CULT: ABNORMAL

## 2024-02-19 LAB
BACTERIA UR CULT: ABNORMAL
BACTERIA UR CULT: ABNORMAL

## 2024-02-19 PROCEDURE — 88304 TISSUE EXAM BY PATHOLOGIST: CPT | Performed by: PATHOLOGY

## 2024-02-19 RX ORDER — AMOXICILLIN AND CLAVULANATE POTASSIUM 875; 125 MG/1; MG/1
1 TABLET, FILM COATED ORAL EVERY 12 HOURS
Qty: 14 TABLET | Refills: 0 | Status: SHIPPED | OUTPATIENT
Start: 2024-02-19 | End: 2024-02-26

## 2024-02-19 NOTE — RESULT ENCOUNTER NOTE
Mother notified of positive influenza B results.  She has had symptoms for 5 days.  Supportive care for now.  She can follow-up as needed.

## 2024-08-22 ENCOUNTER — CONSULT (OUTPATIENT)
Dept: CARDIOLOGY CLINIC | Facility: CLINIC | Age: 74
End: 2024-08-22
Payer: COMMERCIAL

## 2024-08-22 VITALS
OXYGEN SATURATION: 97 % | WEIGHT: 236.8 LBS | BODY MASS INDEX: 41.95 KG/M2 | HEART RATE: 72 BPM | DIASTOLIC BLOOD PRESSURE: 80 MMHG | SYSTOLIC BLOOD PRESSURE: 170 MMHG

## 2024-08-22 DIAGNOSIS — R06.09 DOE (DYSPNEA ON EXERTION): ICD-10-CM

## 2024-08-22 DIAGNOSIS — E03.9 HYPOTHYROIDISM, UNSPECIFIED TYPE: ICD-10-CM

## 2024-08-22 DIAGNOSIS — E11.9 TYPE 2 DIABETES MELLITUS WITHOUT COMPLICATION, WITHOUT LONG-TERM CURRENT USE OF INSULIN (HCC): ICD-10-CM

## 2024-08-22 DIAGNOSIS — I16.0 HYPERTENSIVE URGENCY: ICD-10-CM

## 2024-08-22 DIAGNOSIS — R07.9 CHEST PAIN, UNSPECIFIED TYPE: Primary | ICD-10-CM

## 2024-08-22 DIAGNOSIS — E66.01 MORBID OBESITY WITH BMI OF 45.0-49.9, ADULT (HCC): ICD-10-CM

## 2024-08-22 PROCEDURE — 99204 OFFICE O/P NEW MOD 45 MIN: CPT | Performed by: INTERNAL MEDICINE

## 2024-08-22 PROCEDURE — 93000 ELECTROCARDIOGRAM COMPLETE: CPT | Performed by: INTERNAL MEDICINE

## 2024-08-22 RX ORDER — APREMILAST 30 MG/1
30 TABLET, FILM COATED ORAL 2 TIMES DAILY
COMMUNITY

## 2024-08-22 RX ORDER — LOSARTAN POTASSIUM 50 MG/1
50 TABLET ORAL DAILY
COMMUNITY

## 2024-08-22 NOTE — PROGRESS NOTES
Isatu Ruiz  1950  [id number]  [facility]  [address] [facility]  [address]  [facility] [id number] [phone]    1. Chest pain, unspecified type  Ambulatory Referral to Cardiology    POCT ECG    Echo complete w/ contrast if indicated      2. Hypertensive urgency  POCT ECG      3. Morbid obesity with BMI of 45.0-49.9, adult (HCC)        4. Hypothyroidism, unspecified type        5. CERVANTES (dyspnea on exertion)  Echo complete w/ contrast if indicated      6. Type 2 diabetes mellitus without complication, without long-term current use of insulin (HCC)            Discussion/Summary: The patient was in original ER consult for chest discomfort.  She presented to the emergency department with midepigastric pain was found to have cholecystitis and underwent laparoscopic cholecystectomy.  Pain has subsequently resolved with no return.  Blood pressures were elevated during the setting of pain.  She is a little high again today but she says at home she is normally well-controlled and would like to defer this to her primary physician to manage.  Her CAT scan did comment on a slightly enlarged heart I have recommended an echocardiogram to get a true delineation of cardiac size.  Lipids are mildly elevated we talked about treatment she would like to hold off at this point in time.  I will review her echo and give her a 1 year follow-up if she desires.    Interval History: Very pleasant 73-year-old female presents as a new consultation on behalf of the ER.  She presented with chest discomfort was found to have cholecystitis and underwent cholecystectomy.  CAT scan to rule out pulmonary embolism commented on cardiac enlargement.  Patient denies any exertional chest pain or discomfort following the surgery she has not had any further discomfort in the chest region.  Denies any breathlessness.  She has chronic lower extremity edema secondary to lymphedema.  Functional capacity has been  unchanged.  She is a diabetic we talked about recent blood work.  Non-smoker no alcohol.    Medical Problems       Problem List       Chest pain    Hypertensive urgency    Cholelithiasis    Morbid obesity with BMI of 45.0-49.9, adult (HCC)    Hypothyroidism    CERVANTES (dyspnea on exertion)    Type 2 diabetes mellitus without complication, without long-term current use of insulin (HCC)        Lab Results   Component Value Date    HGBA1C 7.0 (H) 04/25/2024             Past Medical History:   Diagnosis Date    Hypertension     Psoriasis      Social History     Socioeconomic History    Marital status: /Civil Union     Spouse name: Not on file    Number of children: Not on file    Years of education: Not on file    Highest education level: Not on file   Occupational History    Not on file   Tobacco Use    Smoking status: Never    Smokeless tobacco: Never   Vaping Use    Vaping status: Never Used   Substance and Sexual Activity    Alcohol use: Never    Drug use: Never    Sexual activity: Not on file   Other Topics Concern    Not on file   Social History Narrative    Not on file     Social Determinants of Health     Financial Resource Strain: Low Risk  (10/19/2023)    [facility]    Overall Financial Resource Strain (CARDIA)     Difficulty of Paying Living Expenses: Not hard at all   Food Insecurity: No Food Insecurity (10/19/2023)    [facility], [facility]    Hunger Vital Sign     Worried About Running Out of Food in the Last Year: Never true     Ran Out of Food in the Last Year: Never true   Transportation Needs: No Transportation Needs (10/19/2023)    [facility] from [facility], [facility]    PRAPARE - Transportation     Lack of Transportation (Medical): No     Lack of Transportation (Non-Medical): No   Physical Activity: Not on file   Stress: No Stress Concern Present  (10/19/2023)    [facility] from [facility], [facility]    [employer] [place] [employer] - Occupational Stress Questionnaire     Feeling of Stress : Not at all   Social Connections: Moderately Isolated (10/19/2023)    Received from [facility], [facility]    Social Connection and Isolation Panel [NHANES]     Frequency of Communication with Friends and Family: Twice a week     Frequency of Social Gatherings with Friends and Family: Once a week     Attends [employer] Services: Never     Active Member of Clubs or Organizations: No     Attends Club or Organization Meetings: Never     Marital Status:    Intimate Partner Violence: Not At Risk (10/19/2023)    Received from [facility], [facility]    Humiliation, Afraid, Rape, and Kick questionnaire     Fear of Current or Ex-Partner: No     Emotionally Abused: No     Physically Abused: No     Sexually Abused: No   Housing Stability: Low Risk  (10/19/2023)    Received from [facility], [facility]    Housing Stability Vital Sign     Unable to Pay for Housing in the Last Year: No     Number of Places Lived in the Last Year: 1     Unstable Housing in the Last Year: No      History reviewed. No pertinent family history.  Past Surgical History:   Procedure Laterality Date    CHOLECYSTECTOMY LAPAROSCOPIC N/A 2/15/2024    Procedure: CHOLECYSTECTOMY LAPAROSCOPIC;  Surgeon: Jann Harley MD;  Location: [facility];  Service: General    HYSTERECTOMY         Current Outpatient Medications:     Apremilast (Otezla) 30 MG TABS, Take 30 mg by mouth 2 (two) times a day, Disp: , Rfl:     hydrochlorothiazide (HYDRODIURIL) 25 mg tablet, Take 1 tablet (25 mg total) by mouth daily, Disp: 30 tablet, Rfl: 0    levothyroxine 75 mcg tablet, Take by mouth daily, Disp: , Rfl:     losartan (COZAAR) 50 mg tablet, Take 50 mg by mouth daily, Disp:  ", Rfl:     acetaminophen (TYLENOL) 325 mg tablet, Take 2 tablets (650 mg total) by mouth every 6 (six) hours as needed for mild pain, headaches or fever (Patient not taking: Reported on 8/22/2024), Disp: 30 tablet, Rfl: 0  Allergies   Allergen Reactions    Metformin     Sulfa Antibiotics        Labs:     Chemistry        Component Value Date/Time    K 4.0 05/30/2024 1126     05/30/2024 1126    CO2 26 05/30/2024 1126    BUN 14 05/30/2024 1126    CREATININE 0.81 05/30/2024 1126        Component Value Date/Time    CALCIUM 9.5 05/30/2024 1126    ALKPHOS 68 04/25/2024 1537    AST 21 04/25/2024 1537    ALT 17 04/25/2024 1537            No results found for: \"CHOL\"  Lab Results   Component Value Date    HDL 55 08/17/2020     Lab Results   Component Value Date    LDLCALC 127 (H) 08/17/2020     Lab Results   Component Value Date    TRIG 94 08/17/2020     No results found for: \"CHOLHDL\"    Imaging: No results found.    ECG: Normal sinus rhythm      Review of Systems   Constitutional: Negative.   HENT: Negative.     Eyes: Negative.    Cardiovascular: Negative.    Respiratory: Negative.     Endocrine: Negative.    Hematologic/Lymphatic: Negative.    Skin: Negative.    Musculoskeletal: Negative.    Gastrointestinal: Negative.    Genitourinary: Negative.    Neurological: Negative.    Psychiatric/Behavioral: Negative.     All other systems reviewed and are negative.      Vitals:    08/22/24 1055   BP: 170/80   Pulse: 72   SpO2: 97%     Vitals:    08/22/24 1055   Weight: 107 kg (236 lb 12.8 oz)         Body mass index is 41.95 kg/m².    Physical Exam:  Vital signs reviewed.  General appearance:  Appears stated age, alert, well appearing and in no distress  HEENT:  PERRLA, EOMI, no scleral icterus, no conjunctival pallor  NECK:  Supple, No elevated JVP, no thyromegaly, no carotid bruits, no JVD  HEART:  Regular rate and rhythm, normal S1/S2, no S3/S4, no murmur or rub, PMI nondisplaced  LUNGS:  Clear to auscultation " bilaterally, no wheezes rales or rhonchi  ABDOMEN:  Soft, non-tender, positive bowel sounds, no rebound or guarding, no organomegaly   EXTREMITIES:  Normal range of motion.  No clubbing or cyanosis. No edema  VASCULAR:  Normal pedal pulses   SKIN: No lesions or rashes on exposed skin  NEURO:  CN II-XII intact, no focal deficits

## 2024-09-13 ENCOUNTER — HOSPITAL ENCOUNTER (OUTPATIENT)
Dept: NON INVASIVE DIAGNOSTICS | Facility: CLINIC | Age: 74
Discharge: HOME | End: 2024-09-13
Payer: COMMERCIAL

## 2024-09-13 VITALS
SYSTOLIC BLOOD PRESSURE: 170 MMHG | BODY MASS INDEX: 41.82 KG/M2 | DIASTOLIC BLOOD PRESSURE: 80 MMHG | WEIGHT: 236 LBS | HEIGHT: 63 IN | HEART RATE: 72 BPM

## 2024-09-13 DIAGNOSIS — R07.9 CHEST PAIN, UNSPECIFIED TYPE: ICD-10-CM

## 2024-09-13 DIAGNOSIS — R06.09 DOE (DYSPNEA ON EXERTION): ICD-10-CM

## 2024-09-13 LAB
AORTIC ROOT: 3.2 CM
APICAL FOUR CHAMBER EJECTION FRACTION: 60 %
ASCENDING AORTA: 3.2 CM
BSA FOR ECHO PROCEDURE: 2.07 M2
E WAVE DECELERATION TIME: 201 MS
E/A RATIO: 1.27
FRACTIONAL SHORTENING: 33 (ref 28–44)
INTERVENTRICULAR SEPTUM IN DIASTOLE (PARASTERNAL SHORT AXIS VIEW): 0.9 CM
INTERVENTRICULAR SEPTUM: 0.9 CM (ref 0.6–1.1)
LAAS-AP2: 19.5 CM2
LAAS-AP4: 28.1 CM2
LEFT ATRIUM SIZE: 4.4 CM
LEFT ATRIUM VOLUME (MOD BIPLANE): 77 ML
LEFT ATRIUM VOLUME INDEX (MOD BIPLANE): 37 ML/M2
LEFT INTERNAL DIMENSION IN SYSTOLE: 3.6 CM (ref 2.1–4)
LEFT VENTRICULAR INTERNAL DIMENSION IN DIASTOLE: 5.4 CM (ref 3.5–6)
LEFT VENTRICULAR POSTERIOR WALL IN END DIASTOLE: 1.1 CM
LEFT VENTRICULAR STROKE VOLUME: 86 ML
LVSV (TEICH): 86 ML
MV E'TISSUE VEL-SEP: 11 CM/S
MV PEAK A VEL: 0.81 M/S
MV PEAK E VEL: 103 CM/S
MV STENOSIS PRESSURE HALF TIME: 58 MS
MV VALVE AREA P 1/2 METHOD: 3.79
RIGHT ATRIUM AREA SYSTOLE A4C: 18.7 CM2
RIGHT VENTRICLE ID DIMENSION: 4.3 CM
SL CV LEFT ATRIUM LENGTH A2C: 5.7 CM
SL CV LV EF: 55
SL CV PED ECHO LEFT VENTRICLE DIASTOLIC VOLUME (MOD BIPLANE) 2D: 141 ML
SL CV PED ECHO LEFT VENTRICLE SYSTOLIC VOLUME (MOD BIPLANE) 2D: 55 ML
TRICUSPID ANNULAR PLANE SYSTOLIC EXCURSION: 3.1 CM

## 2024-09-13 PROCEDURE — 93306 TTE W/DOPPLER COMPLETE: CPT

## 2024-09-13 PROCEDURE — 93306 TTE W/DOPPLER COMPLETE: CPT | Performed by: INTERNAL MEDICINE

## (undated) DEVICE — TISSUE RETRIEVAL SYSTEM: Brand: INZII RETRIEVAL SYSTEM

## (undated) DEVICE — LUBRICANT JELLY SURGILUBE TUBE 2OZ FLIP TOP

## (undated) DEVICE — INSUFLATION TUBING INSUFLOW (LEXION)

## (undated) DEVICE — PACK PBDS LAP CHOLE RF

## (undated) DEVICE — GLOVE SRG BIOGEL ECLIPSE 7

## (undated) DEVICE — ELECTRODE LAP J HOOK SPLIT STEM E-Z CLEAN 33CM -0021S

## (undated) DEVICE — INTENDED FOR TISSUE SEPARATION, AND OTHER PROCEDURES THAT REQUIRE A SHARP SURGICAL BLADE TO PUNCTURE OR CUT.: Brand: BARD-PARKER SAFETY BLADES SIZE 11, STERILE

## (undated) DEVICE — TROCAR: Brand: KII FIOS FIRST ENTRY

## (undated) DEVICE — TOWEL SURG XR DETECT GREEN STRL RFD

## (undated) DEVICE — HARMONIC 1100 SHEARS, 36CM SHAFT LENGTH: Brand: HARMONIC

## (undated) DEVICE — NEEDLE 23G X 1 1/2 SAFETY-GLIDE THIN WALL

## (undated) DEVICE — LIGAMAX 5 MM ENDOSCOPIC MULTIPLE CLIP APPLIER: Brand: LIGAMAX

## (undated) DEVICE — METZENBAUM ADTEC SINGLE USE DISSECTING SCISSORS, SHAFT ONLY, MONOPOLAR, CURVED TO LEFT, WORKING LENGTH: 12 1/4", (310 MM), DIAM. 5 MM, INSULATED, DOUBLE ACTION, STERILE, DISPOSABLE, PACKAGE OF 10 PIECES: Brand: AESCULAP

## (undated) DEVICE — SUT MONOCRYL 4-0 PS-2 27 IN Y426H

## (undated) DEVICE — ADHESIVE SKIN HIGH VISCOSITY EXOFIN 1ML

## (undated) DEVICE — LAPAROSCOPIC SMOKE EVAC TUBING

## (undated) DEVICE — UNDYED BRAIDED (POLYGLACTIN 910), SYNTHETIC ABSORBABLE SUTURE: Brand: COATED VICRYL

## (undated) DEVICE — TROCAR APPPLE 5MM EXTENDED LENGTH

## (undated) DEVICE — DECANTER: Brand: UNBRANDED